# Patient Record
Sex: MALE | Race: WHITE | NOT HISPANIC OR LATINO | Employment: UNEMPLOYED | URBAN - METROPOLITAN AREA
[De-identification: names, ages, dates, MRNs, and addresses within clinical notes are randomized per-mention and may not be internally consistent; named-entity substitution may affect disease eponyms.]

---

## 2020-02-10 ENCOUNTER — HOSPITAL ENCOUNTER (INPATIENT)
Facility: HOSPITAL | Age: 65
LOS: 2 days | Discharge: HOME | DRG: 178 | End: 2020-02-12
Attending: EMERGENCY MEDICINE | Admitting: FAMILY MEDICINE
Payer: MEDICAID

## 2020-02-10 DIAGNOSIS — R91.8 PULMONARY NODULES: ICD-10-CM

## 2020-02-10 DIAGNOSIS — R06.02 SOB (SHORTNESS OF BREATH): ICD-10-CM

## 2020-02-10 DIAGNOSIS — R93.89 ABNORMAL CHEST X-RAY: Primary | ICD-10-CM

## 2020-02-10 DIAGNOSIS — J18.9 PNEUMONIA: ICD-10-CM

## 2020-02-10 DIAGNOSIS — R07.9 CHEST PAIN: ICD-10-CM

## 2020-02-10 PROBLEM — R53.83 FATIGUE: Status: ACTIVE | Noted: 2020-02-10

## 2020-02-10 PROBLEM — R64 CACHEXIA: Status: ACTIVE | Noted: 2020-02-10

## 2020-02-10 PROBLEM — I10 HYPERTENSION: Status: ACTIVE | Noted: 2020-02-10

## 2020-02-10 LAB
ALBUMIN SERPL BCP-MCNC: 3.2 G/DL (ref 3.5–5.2)
ALLENS TEST: NORMAL
ALP SERPL-CCNC: 104 U/L (ref 55–135)
ALT SERPL W/O P-5'-P-CCNC: 54 U/L (ref 10–44)
ANION GAP SERPL CALC-SCNC: 10 MMOL/L (ref 8–16)
APTT BLDCRRT: 34.8 SEC (ref 21–32)
AST SERPL-CCNC: 34 U/L (ref 10–40)
BASOPHILS # BLD AUTO: 0.05 K/UL (ref 0–0.2)
BASOPHILS NFR BLD: 0.5 % (ref 0–1.9)
BILIRUB SERPL-MCNC: 0.3 MG/DL (ref 0.1–1)
BILIRUB UR QL STRIP: NEGATIVE
BNP SERPL-MCNC: 31 PG/ML (ref 0–99)
BUN SERPL-MCNC: 14 MG/DL (ref 8–23)
CALCIUM SERPL-MCNC: 9.7 MG/DL (ref 8.7–10.5)
CHLORIDE SERPL-SCNC: 103 MMOL/L (ref 95–110)
CLARITY UR: CLEAR
CO2 SERPL-SCNC: 26 MMOL/L (ref 23–29)
COLOR UR: ABNORMAL
CREAT SERPL-MCNC: 0.9 MG/DL (ref 0.5–1.4)
D DIMER PPP IA.FEU-MCNC: 0.96 MG/L FEU
DELSYS: NORMAL
DIFFERENTIAL METHOD: ABNORMAL
EOSINOPHIL # BLD AUTO: 0.2 K/UL (ref 0–0.5)
EOSINOPHIL NFR BLD: 1.7 % (ref 0–8)
ERYTHROCYTE [DISTWIDTH] IN BLOOD BY AUTOMATED COUNT: 12.1 % (ref 11.5–14.5)
EST. GFR  (AFRICAN AMERICAN): >60 ML/MIN/1.73 M^2
EST. GFR  (NON AFRICAN AMERICAN): >60 ML/MIN/1.73 M^2
ESTIMATED AVG GLUCOSE: 114 MG/DL (ref 68–131)
ESTIMATED AVG GLUCOSE: 114 MG/DL (ref 68–131)
GLUCOSE SERPL-MCNC: 112 MG/DL (ref 70–110)
GLUCOSE UR QL STRIP: NEGATIVE
HBA1C MFR BLD HPLC: 5.6 % (ref 4–5.6)
HBA1C MFR BLD HPLC: 5.6 % (ref 4–5.6)
HCO3 UR-SCNC: 25.3 MMOL/L (ref 24–28)
HCT VFR BLD AUTO: 42.5 % (ref 40–54)
HGB BLD-MCNC: 14.2 G/DL (ref 14–18)
HGB UR QL STRIP: NEGATIVE
IMM GRANULOCYTES # BLD AUTO: 0.05 K/UL (ref 0–0.04)
IMM GRANULOCYTES NFR BLD AUTO: 0.5 % (ref 0–0.5)
INR PPP: 1 (ref 0.8–1.2)
KETONES UR QL STRIP: NEGATIVE
LEUKOCYTE ESTERASE UR QL STRIP: NEGATIVE
LYMPHOCYTES # BLD AUTO: 2.2 K/UL (ref 1–4.8)
LYMPHOCYTES NFR BLD: 20.6 % (ref 18–48)
MAGNESIUM SERPL-MCNC: 2 MG/DL (ref 1.6–2.6)
MCH RBC QN AUTO: 29.9 PG (ref 27–31)
MCHC RBC AUTO-ENTMCNC: 33.4 G/DL (ref 32–36)
MCV RBC AUTO: 90 FL (ref 82–98)
MONOCYTES # BLD AUTO: 0.8 K/UL (ref 0.3–1)
MONOCYTES NFR BLD: 7.5 % (ref 4–15)
NEUTROPHILS # BLD AUTO: 7.4 K/UL (ref 1.8–7.7)
NEUTROPHILS NFR BLD: 69.2 % (ref 38–73)
NITRITE UR QL STRIP: NEGATIVE
NRBC BLD-RTO: 0 /100 WBC
PCO2 BLDA: 36.9 MMHG (ref 35–45)
PH SMN: 7.44 [PH] (ref 7.35–7.45)
PH UR STRIP: 6 [PH] (ref 5–8)
PHOSPHATE SERPL-MCNC: 2.7 MG/DL (ref 2.7–4.5)
PLATELET # BLD AUTO: 598 K/UL (ref 150–350)
PMV BLD AUTO: 10.1 FL (ref 9.2–12.9)
PO2 BLDA: 86 MMHG (ref 80–100)
POC BE: 1 MMOL/L
POC SATURATED O2: 97 % (ref 95–100)
POC TCO2: 26 MMOL/L (ref 23–27)
POCT GLUCOSE: 83 MG/DL (ref 70–110)
POTASSIUM SERPL-SCNC: 3.6 MMOL/L (ref 3.5–5.1)
PROCALCITONIN SERPL IA-MCNC: 0.05 NG/ML
PROT SERPL-MCNC: 8 G/DL (ref 6–8.4)
PROT UR QL STRIP: ABNORMAL
PROTHROMBIN TIME: 10.6 SEC (ref 9–12.5)
RBC # BLD AUTO: 4.75 M/UL (ref 4.6–6.2)
SAMPLE: NORMAL
SITE: NORMAL
SODIUM SERPL-SCNC: 139 MMOL/L (ref 136–145)
SP GR UR STRIP: >=1.03 (ref 1–1.03)
TROPONIN I SERPL DL<=0.01 NG/ML-MCNC: 0.01 NG/ML (ref 0–0.03)
TSH SERPL DL<=0.005 MIU/L-ACNC: 2.59 UIU/ML (ref 0.4–4)
URN SPEC COLLECT METH UR: ABNORMAL
UROBILINOGEN UR STRIP-ACNC: NEGATIVE EU/DL
WBC # BLD AUTO: 10.69 K/UL (ref 3.9–12.7)

## 2020-02-10 PROCEDURE — 96374 THER/PROPH/DIAG INJ IV PUSH: CPT

## 2020-02-10 PROCEDURE — 12000002 HC ACUTE/MED SURGE SEMI-PRIVATE ROOM

## 2020-02-10 PROCEDURE — 93005 ELECTROCARDIOGRAM TRACING: CPT

## 2020-02-10 PROCEDURE — 25500020 PHARM REV CODE 255: Performed by: EMERGENCY MEDICINE

## 2020-02-10 PROCEDURE — 85730 THROMBOPLASTIN TIME PARTIAL: CPT

## 2020-02-10 PROCEDURE — 84484 ASSAY OF TROPONIN QUANT: CPT

## 2020-02-10 PROCEDURE — 93010 ELECTROCARDIOGRAM REPORT: CPT | Mod: ,,, | Performed by: INTERNAL MEDICINE

## 2020-02-10 PROCEDURE — 83880 ASSAY OF NATRIURETIC PEPTIDE: CPT

## 2020-02-10 PROCEDURE — G0378 HOSPITAL OBSERVATION PER HR: HCPCS

## 2020-02-10 PROCEDURE — 85610 PROTHROMBIN TIME: CPT

## 2020-02-10 PROCEDURE — 83735 ASSAY OF MAGNESIUM: CPT

## 2020-02-10 PROCEDURE — 93010 EKG 12-LEAD: ICD-10-PCS | Mod: 77,,, | Performed by: INTERNAL MEDICINE

## 2020-02-10 PROCEDURE — 25000003 PHARM REV CODE 250: Performed by: STUDENT IN AN ORGANIZED HEALTH CARE EDUCATION/TRAINING PROGRAM

## 2020-02-10 PROCEDURE — 86703 HIV-1/HIV-2 1 RESULT ANTBDY: CPT

## 2020-02-10 PROCEDURE — 84145 PROCALCITONIN (PCT): CPT

## 2020-02-10 PROCEDURE — 85379 FIBRIN DEGRADATION QUANT: CPT

## 2020-02-10 PROCEDURE — 86803 HEPATITIS C AB TEST: CPT

## 2020-02-10 PROCEDURE — 63600175 PHARM REV CODE 636 W HCPCS: Performed by: STUDENT IN AN ORGANIZED HEALTH CARE EDUCATION/TRAINING PROGRAM

## 2020-02-10 PROCEDURE — 85025 COMPLETE CBC W/AUTO DIFF WBC: CPT

## 2020-02-10 PROCEDURE — 25000003 PHARM REV CODE 250: Performed by: NURSE PRACTITIONER

## 2020-02-10 PROCEDURE — 84443 ASSAY THYROID STIM HORMONE: CPT

## 2020-02-10 PROCEDURE — 80053 COMPREHEN METABOLIC PANEL: CPT

## 2020-02-10 PROCEDURE — 81003 URINALYSIS AUTO W/O SCOPE: CPT

## 2020-02-10 PROCEDURE — 36600 WITHDRAWAL OF ARTERIAL BLOOD: CPT

## 2020-02-10 PROCEDURE — 99900035 HC TECH TIME PER 15 MIN (STAT)

## 2020-02-10 PROCEDURE — 99285 EMERGENCY DEPT VISIT HI MDM: CPT | Mod: 25

## 2020-02-10 PROCEDURE — 84100 ASSAY OF PHOSPHORUS: CPT

## 2020-02-10 PROCEDURE — 83036 HEMOGLOBIN GLYCOSYLATED A1C: CPT

## 2020-02-10 PROCEDURE — 93010 ELECTROCARDIOGRAM REPORT: CPT | Mod: 77,,, | Performed by: INTERNAL MEDICINE

## 2020-02-10 PROCEDURE — 25000003 PHARM REV CODE 250: Performed by: EMERGENCY MEDICINE

## 2020-02-10 PROCEDURE — 87385 HISTOPLASMA CAPSUL AG IA: CPT

## 2020-02-10 PROCEDURE — 25000242 PHARM REV CODE 250 ALT 637 W/ HCPCS: Performed by: EMERGENCY MEDICINE

## 2020-02-10 PROCEDURE — 82803 BLOOD GASES ANY COMBINATION: CPT

## 2020-02-10 PROCEDURE — 96372 THER/PROPH/DIAG INJ SC/IM: CPT | Mod: 59

## 2020-02-10 PROCEDURE — 93010 EKG 12-LEAD: ICD-10-PCS | Mod: ,,, | Performed by: INTERNAL MEDICINE

## 2020-02-10 PROCEDURE — 94640 AIRWAY INHALATION TREATMENT: CPT

## 2020-02-10 RX ORDER — ALBUTEROL SULFATE 2.5 MG/.5ML
2.5 SOLUTION RESPIRATORY (INHALATION)
Status: COMPLETED | OUTPATIENT
Start: 2020-02-10 | End: 2020-02-10

## 2020-02-10 RX ORDER — ASPIRIN 325 MG
325 TABLET ORAL
Status: COMPLETED | OUTPATIENT
Start: 2020-02-10 | End: 2020-02-10

## 2020-02-10 RX ORDER — SODIUM CHLORIDE 0.9 % (FLUSH) 0.9 %
5 SYRINGE (ML) INJECTION
Status: DISCONTINUED | OUTPATIENT
Start: 2020-02-10 | End: 2020-02-12 | Stop reason: HOSPADM

## 2020-02-10 RX ORDER — METOPROLOL TARTRATE 1 MG/ML
5 INJECTION, SOLUTION INTRAVENOUS EVERY 5 MIN PRN
Status: DISCONTINUED | OUTPATIENT
Start: 2020-02-10 | End: 2020-02-12 | Stop reason: HOSPADM

## 2020-02-10 RX ORDER — ENOXAPARIN SODIUM 100 MG/ML
40 INJECTION SUBCUTANEOUS EVERY 24 HOURS
Status: DISCONTINUED | OUTPATIENT
Start: 2020-02-10 | End: 2020-02-12 | Stop reason: HOSPADM

## 2020-02-10 RX ORDER — METOPROLOL TARTRATE 25 MG/1
25 TABLET, FILM COATED ORAL
Status: COMPLETED | OUTPATIENT
Start: 2020-02-10 | End: 2020-02-10

## 2020-02-10 RX ORDER — HYDRALAZINE HYDROCHLORIDE 20 MG/ML
10 INJECTION INTRAMUSCULAR; INTRAVENOUS EVERY 6 HOURS PRN
Status: DISCONTINUED | OUTPATIENT
Start: 2020-02-10 | End: 2020-02-12 | Stop reason: HOSPADM

## 2020-02-10 RX ORDER — AMOXICILLIN 250 MG
1 CAPSULE ORAL 2 TIMES DAILY
Status: DISCONTINUED | OUTPATIENT
Start: 2020-02-10 | End: 2020-02-12 | Stop reason: HOSPADM

## 2020-02-10 RX ADMIN — METOPROLOL TARTRATE 5 MG: 5 INJECTION, SOLUTION INTRAVENOUS at 04:02

## 2020-02-10 RX ADMIN — ALBUTEROL SULFATE 2.5 MG: 2.5 SOLUTION RESPIRATORY (INHALATION) at 03:02

## 2020-02-10 RX ADMIN — ASPIRIN 325 MG ORAL TABLET 325 MG: 325 PILL ORAL at 03:02

## 2020-02-10 RX ADMIN — METOPROLOL TARTRATE 25 MG: 25 TABLET ORAL at 04:02

## 2020-02-10 RX ADMIN — IOHEXOL 100 ML: 350 INJECTION, SOLUTION INTRAVENOUS at 07:02

## 2020-02-10 RX ADMIN — STANDARDIZED SENNA CONCENTRATE AND DOCUSATE SODIUM 1 TABLET: 8.6; 5 TABLET ORAL at 10:02

## 2020-02-10 RX ADMIN — ALBUTEROL SULFATE 2.5 MG: 2.5 SOLUTION RESPIRATORY (INHALATION) at 04:02

## 2020-02-10 RX ADMIN — ENOXAPARIN SODIUM 40 MG: 100 INJECTION SUBCUTANEOUS at 10:02

## 2020-02-10 NOTE — ED PROVIDER NOTES
Encounter Date: 2/10/2020       History     Chief Complaint   Patient presents with    Shortness of Breath     Reports SOB over the last 4 weeks. Worse with exertion. /136 at triage, pt using accessory muscles and displaying some difficulty with speaking completes sentences.      The patient is a 64 year male who has no diagnosed chronic medical conditions who presents with several days of exertional shortness of breath. This was preceded three and half weeks heavy cough.  The cough was intermittently productive of white sputum.  The cough was so severe that he was having ribcage discomfort.  He took cough drops and over-the-counter cough syrup which provided some relief.  He denies fever and chills.  He denies nausea vomiting.  Denies abdominal pain. He denies headache, lightheadedness, and dizziness.  He does not smoke cigarettes but does occasionally smoke marijuana.  He reports a 15-20 pound weight loss over the past 3-4 weeks.  He has also had decreased appetite over this time.    The history is provided by the patient. No  was used.     Review of patient's allergies indicates:  No Known Allergies  No past medical history on file.  No past surgical history on file.  No family history on file.  Social History     Tobacco Use    Smoking status: Not on file   Substance Use Topics    Alcohol use: Not on file    Drug use: Not on file     Review of Systems   Constitutional: Positive for appetite change and unexpected weight change. Negative for chills, diaphoresis, fatigue and fever.   HENT: Negative for sore throat and trouble swallowing.    Eyes: Negative for visual disturbance.   Respiratory: Positive for cough and shortness of breath.    Cardiovascular: Negative for chest pain, palpitations and leg swelling.   Gastrointestinal: Negative for abdominal pain, blood in stool, diarrhea, nausea and vomiting.   Endocrine: Negative for polydipsia and polyuria.   Genitourinary: Negative for  dysuria, frequency and hematuria.   Musculoskeletal: Negative for arthralgias and myalgias.   Skin: Negative for rash.   Allergic/Immunologic: Negative for immunocompromised state.   Neurological: Negative for dizziness, syncope, weakness, light-headedness, numbness and headaches.   Hematological: Does not bruise/bleed easily.       Physical Exam     Initial Vitals [02/10/20 1450]   BP Pulse Resp Temp SpO2   (!) 224/136 108 (!) 30 97.9 °F (36.6 °C) 95 %      MAP       --         Physical Exam    Nursing note and vitals reviewed.  Constitutional: He appears well-developed and well-nourished. He is not diaphoretic. No distress.   HENT:   Head: Normocephalic and atraumatic.   Mouth/Throat: Oropharynx is clear and moist. No oropharyngeal exudate or posterior oropharyngeal erythema.   Eyes: Conjunctivae are normal. No scleral icterus.   Neck: No JVD present.   Cardiovascular: Regular rhythm and normal heart sounds. Tachycardia present.  Exam reveals no gallop and no friction rub.    No murmur heard.  Pulmonary/Chest: Effort normal. No stridor. No respiratory distress. He has no decreased breath sounds. He has no wheezes. He has no rhonchi. He has rales (Bilateral lower lung zone).   Abdominal: Soft. He exhibits no distension. There is no tenderness.   Musculoskeletal: Normal range of motion. He exhibits no edema or tenderness.   No calf swelling or tenderness bilaterally. Negative bilateral Domingo's sign.   Neurological: He is alert and oriented to person, place, and time. No cranial nerve deficit. GCS score is 15. GCS eye subscore is 4. GCS verbal subscore is 5. GCS motor subscore is 6.   Skin: Skin is warm and dry. No pallor.         ED Course   Procedures  Labs Reviewed   CBC W/ AUTO DIFFERENTIAL - Abnormal; Notable for the following components:       Result Value    Platelets 598 (*)     Immature Grans (Abs) 0.05 (*)     All other components within normal limits   COMPREHENSIVE METABOLIC PANEL - Abnormal; Notable for  the following components:    Glucose 112 (*)     Albumin 3.2 (*)     ALT 54 (*)     All other components within normal limits   URINALYSIS, REFLEX TO URINE CULTURE - Abnormal; Notable for the following components:    Color, UA Brown (*)     Specific Gravity, UA >=1.030 (*)     Protein, UA Trace (*)     All other components within normal limits    Narrative:     Preferred Collection Type->Urine, Clean Catch   APTT - Abnormal; Notable for the following components:    aPTT 34.8 (*)     All other components within normal limits   D DIMER, QUANTITATIVE - Abnormal; Notable for the following components:    D-Dimer 0.96 (*)     All other components within normal limits   TROPONIN I   B-TYPE NATRIURETIC PEPTIDE   PROTIME-INR   D DIMER, QUANTITATIVE   ISTAT PROCEDURE     EKG Readings: (Independently Interpreted)   02/10/2020 15:08  Normal sinus rhythm.  Ventricular rate 91 beats per minute.  Normal axis.  Normal QRS interval.  Prolonged QT interval.  No ST segment elevation or depression.  Normal T-wave morphology.       Imaging Results           CTA Chest Non-Coronary (PE Study) (Final result)  Result time 02/10/20 19:59:29    Final result by Angel Patel MD (02/10/20 19:59:29)                 Impression:      This report was flagged in Epic as abnormal.    1. No pulmonary thromboembolism.  2. Pulmonary emphysematous change noting peribronchial thickening centrally, could reflect bronchial airway infection or inflammation.  3. Multifocal tree-in-bud type nodules with additional regions of consolidative opacity with central lucency, may reflect early cavitation.  The largest focus is within the right lower lobe.  Findings are nonspecific and could reflect sequela of multifocal infection, correlation is recommended.  Given background emphysema, malignancy can not be excluded in this setting.  Correlation with patient history and clinical symptoms is recommended.  Short-term follow-up, no greater than 3 months, is  recommended to ensure resolution and assess for progression.  Comparison with any previous examinations would be helpful.  4. Several additional findings above.      Electronically signed by: Angel Patel MD  Date:    02/10/2020  Time:    19:59             Narrative:    EXAMINATION:  CTA CHEST NON CORONARY    CLINICAL HISTORY:  Chest pain, acute, PE suspected, intermed prob, positive D-dimer;    TECHNIQUE:  Low dose axial images, sagittal and coronal reformations were obtained from the thoracic inlet to the lung bases following the IV administration of 100 mL of Omnipaque 350.  Contrast timing was optimized to evaluate the pulmonary arteries.  MIP images were performed.    COMPARISON:  None    FINDINGS:  The structures at the base of the neck are grossly unremarkable.  There are a few scattered nonenlarged mediastinal lymph nodes.  The heart is not enlarged.  There is calcification in the distribution of the coronary arteries.  No pericardial effusion.  The thoracic aorta tapers normally.  There is atherosclerotic calcification of the aorta.  The visualized portions of the kidneys, spleen, pancreas, gallbladder and liver are grossly unremarkable.  There is a minimal hiatal hernia.    The airways are patent, noting there is mild central peribronchial thickening.  There is bilateral emphysematous change.  There is atelectasis along the lateral aspect of the fissure on the right.  There is anterior right middle lobe atelectasis.  There is right basilar dependent atelectasis.  There are a few tree-in-bud type nodules within the periphery of the right lower lobe measuring up to 3-4 mm.  There is a focus of consolidation along the periphery of the right lower lobe with surrounding tree-in-bud type nodularity.  There is an additional focus of consolidative opacity with possible central lucency measuring 1.2 cm.  A similar although smaller focus is noted more cranially within the right lower lobe measuring 6-7 mm.  There  is right apical atelectasis or scarring.  There are scattered left upper lobe pleural based pulmonary nodules measuring 3-4 mm.  There is consolidative opacity with central lucency along the fissure on the left within the left lower lobe, measuring 1.9 cm.  There is a calcified granuloma within the left lower lobe.  There are scattered foci of tree-in-bud nodularity along the periphery of the left lower lobe.  No pneumothorax.  No pleural effusion.    Bolus timing is adequate for evaluation of pulmonary thromboembolism.  No pulmonary arterial filling defect to the level of the segmental arteries bilaterally to suggest pulmonary thromboembolism.    There is osteopenia.  Degenerative changes are noted of the spine.  Multilevel thoracic spine vertebral bodies Schmorl's nodes noted.  No significant axillary lymphadenopathy.                                X-Ray Chest AP Portable (Final result)  Result time 02/10/20 15:47:16    Final result by Ramon Nation MD (02/10/20 15:47:16)                 Impression:      Ill-defined masslike opacity over the right lower lung zone.  While this could represent a an area of focal consolidation/pneumonia, an underlying neoplasm could have a similar appearance and further evaluation with chest CT is recommended.  There is a small ill-defined nodule in the left lower lung zone which can also be evaluated with chest CT.    This report was flagged in Epic as abnormal.      Electronically signed by: Ramon Nation MD  Date:    02/10/2020  Time:    15:47             Narrative:    EXAMINATION:  XR CHEST AP PORTABLE    CLINICAL HISTORY:  SOB;    TECHNIQUE:  Single frontal view of the chest was performed.    COMPARISON:  06/15/2012    FINDINGS:  Cardiomediastinal silhouette is within normal limits.  Left lung is relatively clear and well expanded.  High density nodule in the left mid lung zone likely a granuloma.  There is an ill-defined nodule just inferior to this granuloma.  There is an  ill-defined masslike opacity over the right lower lung zone measuring approximately 5 cm.  No evidence of pneumothorax or large pleural effusion.  Bones show degenerative changes.                                 Medical Decision Making:   Independently Interpreted Test(s):   I have ordered and independently interpreted EKG Reading(s) - see prior notes  Clinical Tests:   Lab Tests: Ordered and Reviewed  Radiological Study: Ordered and Reviewed  Medical Tests: Ordered and Reviewed    Medical decision making:  This patient underwent evaluation for several days of exertional shortness of breath. This was preceded several weeks of heavy cough.  He also reported significant weight loss over this period of time.  Differential diagnoses included acute coronary syndromes, new onset CHF, cardiac arrhythmia, pneumonia and other infectious processes.  Chest x-ray was abnormal.  Subsequent CT of the chest had findings concerning for infectious process or malignancy.  The patient was admitted to Hospital Medicine for further evaluation and management.                   ED Course as of Feb 10 2035   Mon Feb 10, 2020   2035 Discussed the patient's presentation and workup with the [facility] family medicine resident who will evaluate the patient for admission.    [LP]      ED Course User Index  [LP] Kike Olsen III, MD                Clinical Impression:       ICD-10-CM ICD-9-CM   1. Abnormal chest x-ray R93.89 793.2   2. SOB (shortness of breath) R06.02 786.05   3. Pneumonia J18.9 486         Disposition:   Disposition: Admitted  Condition: Fair                     Kike Olsen III, MD  02/12/20 0726

## 2020-02-10 NOTE — ED NOTES
Patient placed on continuous cardiac monitor, automatic blood pressure cuff and continuous pulse oximeter. Pt placed on 2L O2 via NC.

## 2020-02-10 NOTE — ED TRIAGE NOTES
Pt presents to ED with complaints of SOB x1 week. Pt states that he had the flu about 2 weeks ago and thought he had gotten past it and states he felt fine. He states about a week ago he noticed he was getting winded doing activities he does every day. Pt states that the SOB has gotten worse over the week. Pt states he went to urgent care, but was told to come here. Pt states he has also had a productive cough with white sputum. Pt denies CP, abdominal pain, N/V/D, weakness, numbness, tingling, fever. Pt denies pain.

## 2020-02-10 NOTE — ED NOTES
Pt in bed talking on cellphone, pt denies needing anything at this time. Call light within reach, will continue to monitor.

## 2020-02-10 NOTE — ED NOTES
Spoke to Dr. Olsen in regards to pts BP as BP does not meet parameters set for IV Metoprolol; Per Dr. Olsen-hold off on IV metoprolol, to order PO instead

## 2020-02-11 PROBLEM — J18.9 PNEUMONIA: Status: ACTIVE | Noted: 2020-02-11

## 2020-02-11 LAB
ALBUMIN SERPL BCP-MCNC: 3 G/DL (ref 3.5–5.2)
ALP SERPL-CCNC: 94 U/L (ref 55–135)
ALT SERPL W/O P-5'-P-CCNC: 50 U/L (ref 10–44)
ANION GAP SERPL CALC-SCNC: 9 MMOL/L (ref 8–16)
AST SERPL-CCNC: 33 U/L (ref 10–40)
BASOPHILS # BLD AUTO: 0.06 K/UL (ref 0–0.2)
BASOPHILS NFR BLD: 0.6 % (ref 0–1.9)
BILIRUB SERPL-MCNC: 0.5 MG/DL (ref 0.1–1)
BUN SERPL-MCNC: 14 MG/DL (ref 8–23)
CALCIUM SERPL-MCNC: 9.5 MG/DL (ref 8.7–10.5)
CHLORIDE SERPL-SCNC: 104 MMOL/L (ref 95–110)
CO2 SERPL-SCNC: 26 MMOL/L (ref 23–29)
CREAT SERPL-MCNC: 0.8 MG/DL (ref 0.5–1.4)
CRP SERPL-MCNC: 17.6 MG/L (ref 0–8.2)
DIFFERENTIAL METHOD: ABNORMAL
EOSINOPHIL # BLD AUTO: 0.2 K/UL (ref 0–0.5)
EOSINOPHIL NFR BLD: 1.5 % (ref 0–8)
ERYTHROCYTE [DISTWIDTH] IN BLOOD BY AUTOMATED COUNT: 12.2 % (ref 11.5–14.5)
ERYTHROCYTE [SEDIMENTATION RATE] IN BLOOD BY WESTERGREN METHOD: 85 MM/HR (ref 0–10)
EST. GFR  (AFRICAN AMERICAN): >60 ML/MIN/1.73 M^2
EST. GFR  (NON AFRICAN AMERICAN): >60 ML/MIN/1.73 M^2
GLUCOSE SERPL-MCNC: 97 MG/DL (ref 70–110)
HCT VFR BLD AUTO: 39.9 % (ref 40–54)
HCV AB SERPL QL IA: NEGATIVE
HGB BLD-MCNC: 13.5 G/DL (ref 14–18)
HIV 1+2 AB+HIV1 P24 AG SERPL QL IA: NEGATIVE
IMM GRANULOCYTES # BLD AUTO: 0.02 K/UL (ref 0–0.04)
IMM GRANULOCYTES NFR BLD AUTO: 0.2 % (ref 0–0.5)
INFLUENZA A, MOLECULAR: NEGATIVE
INFLUENZA B, MOLECULAR: NEGATIVE
LDH SERPL L TO P-CCNC: 174 U/L (ref 110–260)
LYMPHOCYTES # BLD AUTO: 2.1 K/UL (ref 1–4.8)
LYMPHOCYTES NFR BLD: 20.2 % (ref 18–48)
MAGNESIUM SERPL-MCNC: 2.1 MG/DL (ref 1.6–2.6)
MCH RBC QN AUTO: 30.5 PG (ref 27–31)
MCHC RBC AUTO-ENTMCNC: 33.8 G/DL (ref 32–36)
MCV RBC AUTO: 90 FL (ref 82–98)
MONOCYTES # BLD AUTO: 0.7 K/UL (ref 0.3–1)
MONOCYTES NFR BLD: 7.2 % (ref 4–15)
NEUTROPHILS # BLD AUTO: 7.1 K/UL (ref 1.8–7.7)
NEUTROPHILS NFR BLD: 70.3 % (ref 38–73)
NRBC BLD-RTO: 0 /100 WBC
PHOSPHATE SERPL-MCNC: 2.9 MG/DL (ref 2.7–4.5)
PLATELET # BLD AUTO: 532 K/UL (ref 150–350)
PMV BLD AUTO: 9.4 FL (ref 9.2–12.9)
POCT GLUCOSE: 85 MG/DL (ref 70–110)
POCT GLUCOSE: 87 MG/DL (ref 70–110)
POTASSIUM SERPL-SCNC: 4.1 MMOL/L (ref 3.5–5.1)
PROT SERPL-MCNC: 7.4 G/DL (ref 6–8.4)
RBC # BLD AUTO: 4.43 M/UL (ref 4.6–6.2)
SODIUM SERPL-SCNC: 139 MMOL/L (ref 136–145)
SPECIMEN SOURCE: NORMAL
WBC # BLD AUTO: 10.15 K/UL (ref 3.9–12.7)

## 2020-02-11 PROCEDURE — 97165 OT EVAL LOW COMPLEX 30 MIN: CPT

## 2020-02-11 PROCEDURE — 85025 COMPLETE CBC W/AUTO DIFF WBC: CPT

## 2020-02-11 PROCEDURE — 97161 PT EVAL LOW COMPLEX 20 MIN: CPT

## 2020-02-11 PROCEDURE — 25000242 PHARM REV CODE 250 ALT 637 W/ HCPCS: Performed by: STUDENT IN AN ORGANIZED HEALTH CARE EDUCATION/TRAINING PROGRAM

## 2020-02-11 PROCEDURE — 63600175 PHARM REV CODE 636 W HCPCS: Performed by: STUDENT IN AN ORGANIZED HEALTH CARE EDUCATION/TRAINING PROGRAM

## 2020-02-11 PROCEDURE — 94640 AIRWAY INHALATION TREATMENT: CPT

## 2020-02-11 PROCEDURE — 94761 N-INVAS EAR/PLS OXIMETRY MLT: CPT

## 2020-02-11 PROCEDURE — 11000001 HC ACUTE MED/SURG PRIVATE ROOM

## 2020-02-11 PROCEDURE — 80053 COMPREHEN METABOLIC PANEL: CPT

## 2020-02-11 PROCEDURE — 85652 RBC SED RATE AUTOMATED: CPT

## 2020-02-11 PROCEDURE — 83615 LACTATE (LD) (LDH) ENZYME: CPT

## 2020-02-11 PROCEDURE — 25000003 PHARM REV CODE 250: Performed by: STUDENT IN AN ORGANIZED HEALTH CARE EDUCATION/TRAINING PROGRAM

## 2020-02-11 PROCEDURE — 86480 TB TEST CELL IMMUN MEASURE: CPT

## 2020-02-11 PROCEDURE — 86140 C-REACTIVE PROTEIN: CPT

## 2020-02-11 PROCEDURE — 83735 ASSAY OF MAGNESIUM: CPT

## 2020-02-11 PROCEDURE — 84100 ASSAY OF PHOSPHORUS: CPT

## 2020-02-11 PROCEDURE — 97535 SELF CARE MNGMENT TRAINING: CPT

## 2020-02-11 PROCEDURE — 87502 INFLUENZA DNA AMP PROBE: CPT

## 2020-02-11 PROCEDURE — 96375 TX/PRO/DX INJ NEW DRUG ADDON: CPT

## 2020-02-11 PROCEDURE — 99900035 HC TECH TIME PER 15 MIN (STAT)

## 2020-02-11 PROCEDURE — 27000221 HC OXYGEN, UP TO 24 HOURS

## 2020-02-11 RX ORDER — TALC
6 POWDER (GRAM) TOPICAL NIGHTLY PRN
Status: DISCONTINUED | OUTPATIENT
Start: 2020-02-11 | End: 2020-02-12 | Stop reason: HOSPADM

## 2020-02-11 RX ORDER — CEFTRIAXONE 1 G/1
1 INJECTION, POWDER, FOR SOLUTION INTRAMUSCULAR; INTRAVENOUS ONCE
Status: DISCONTINUED | OUTPATIENT
Start: 2020-02-11 | End: 2020-02-11

## 2020-02-11 RX ORDER — IPRATROPIUM BROMIDE AND ALBUTEROL SULFATE 2.5; .5 MG/3ML; MG/3ML
3 SOLUTION RESPIRATORY (INHALATION)
Status: DISCONTINUED | OUTPATIENT
Start: 2020-02-11 | End: 2020-02-12 | Stop reason: HOSPADM

## 2020-02-11 RX ORDER — AMLODIPINE BESYLATE 5 MG/1
10 TABLET ORAL DAILY
Status: DISCONTINUED | OUTPATIENT
Start: 2020-02-11 | End: 2020-02-12 | Stop reason: HOSPADM

## 2020-02-11 RX ORDER — IBUPROFEN 400 MG/1
400 TABLET ORAL EVERY 6 HOURS PRN
Status: DISCONTINUED | OUTPATIENT
Start: 2020-02-11 | End: 2020-02-12 | Stop reason: HOSPADM

## 2020-02-11 RX ADMIN — IPRATROPIUM BROMIDE AND ALBUTEROL SULFATE 3 ML: .5; 3 SOLUTION RESPIRATORY (INHALATION) at 11:02

## 2020-02-11 RX ADMIN — ENOXAPARIN SODIUM 40 MG: 100 INJECTION SUBCUTANEOUS at 10:02

## 2020-02-11 RX ADMIN — STANDARDIZED SENNA CONCENTRATE AND DOCUSATE SODIUM 1 TABLET: 8.6; 5 TABLET ORAL at 10:02

## 2020-02-11 RX ADMIN — HYDRALAZINE HYDROCHLORIDE 10 MG: 20 INJECTION INTRAMUSCULAR; INTRAVENOUS at 09:02

## 2020-02-11 RX ADMIN — STANDARDIZED SENNA CONCENTRATE AND DOCUSATE SODIUM 1 TABLET: 8.6; 5 TABLET ORAL at 09:02

## 2020-02-11 RX ADMIN — IPRATROPIUM BROMIDE AND ALBUTEROL SULFATE 3 ML: .5; 3 SOLUTION RESPIRATORY (INHALATION) at 04:02

## 2020-02-11 RX ADMIN — Medication 6 MG: at 10:02

## 2020-02-11 RX ADMIN — IPRATROPIUM BROMIDE AND ALBUTEROL SULFATE 3 ML: .5; 3 SOLUTION RESPIRATORY (INHALATION) at 08:02

## 2020-02-11 RX ADMIN — AZITHROMYCIN MONOHYDRATE 500 MG: 500 INJECTION, POWDER, LYOPHILIZED, FOR SOLUTION INTRAVENOUS at 11:02

## 2020-02-11 RX ADMIN — IBUPROFEN 400 MG: 400 TABLET, FILM COATED ORAL at 10:02

## 2020-02-11 RX ADMIN — AMLODIPINE BESYLATE 10 MG: 5 TABLET ORAL at 10:02

## 2020-02-11 RX ADMIN — CEFTRIAXONE 1 G: 1 INJECTION, SOLUTION INTRAVENOUS at 10:02

## 2020-02-11 NOTE — H&P
[facility] Medicine  History & Physical    Patient Name: Chuy Stevens  MRN: [id number]  Admission Date: 2/10/2020  Attending Physician: Andrés Gee MD   Primary Care Provider: Primary Doctor No         Patient information was obtained from patient and ER records.     Subjective:     Principal Problem:Pulmonary nodules    Chief Complaint:   Chief Complaint   Patient presents with    Shortness of Breath     Reports SOB over the last 4 weeks. Worse with exertion. /136 at triage, pt using accessory muscles and displaying some difficulty with speaking completes sentences.         HPI: Mr. Chuy Moran is a 63 yo man with no known PMH presenting to hospital for dyspnea on exertion over past 4 weeks. Patient BP was 244/136 at presentation, decreaed to 174/110 with metoprolol. Patient displayed some SOB. Patient DDimer was elevated, CTA negative for PE, but positive for nodules of unknown significance. Patient placed in isolation as TB precaution. Per patient, he smoked cigarettes from age 10 to age 27, and has since only smoked marijuana. Patient has used cocaine in the past, but denies IVDU. Patient's father  of liver cancer, and his brother was recently diagnosed. Patient is a  by [place], and has been active and exercising until for weeks ago when he had flu-like symptoms, cough productive of whitish sputum, and lost 25 lbs rapidly. His appetite has been decreased. Patient denies N/V, endorses chills and subjective fever. Patient admitted to hospital for further evaluation.     No past medical history on file.    No past surgical history on file.    Review of patient's allergies indicates:  No Known Allergies    No current facility-administered medications on file prior to encounter.      No current outpatient medications on file prior to encounter.     Family History     None        Tobacco Use    Smoking status: Not on file   Substance and Sexual Activity     Alcohol use: Not on file    Drug use: Not on file    Sexual activity: Not on file     Review of Systems   Constitutional: Positive for activity change, appetite change, chills, fatigue and unexpected weight change.   HENT: Positive for dental problem and ear pain. Negative for congestion, drooling, ear discharge, facial swelling, hearing loss, mouth sores, nosebleeds, postnasal drip, rhinorrhea, sinus pressure and sinus pain.    Eyes: Negative for discharge and itching.   Respiratory: Positive for cough, choking and shortness of breath. Negative for apnea, chest tightness, wheezing and stridor.    Cardiovascular: Negative for chest pain, palpitations and leg swelling.   Gastrointestinal: Negative for abdominal distention, abdominal pain, anal bleeding, blood in stool, constipation, diarrhea, nausea, rectal pain and vomiting.   Endocrine: Negative for cold intolerance, heat intolerance and polydipsia.   Musculoskeletal: Positive for myalgias. Negative for arthralgias, back pain, gait problem and joint swelling.   Skin: Positive for pallor. Negative for color change, rash and wound.   Neurological: Positive for dizziness, tremors, light-headedness and headaches. Negative for seizures, syncope and speech difficulty.        Family hx of parkinsons' disease, patient suspects his tremor may be parkinsons   Hematological: Negative for adenopathy.   Psychiatric/Behavioral: Negative for agitation, behavioral problems, confusion, decreased concentration and dysphoric mood.     Objective:     Vital Signs (Most Recent):  Temp: 97.9 °F (36.6 °C) (02/10/20 1450)  Pulse: 64 (02/10/20 1921)  Resp: 20 (02/10/20 1921)  BP: (!) 174/110 (02/10/20 1921)  SpO2: 99 % (02/10/20 1921) Vital Signs (24h Range):  Temp:  [97.9 °F (36.6 °C)] 97.9 °F (36.6 °C)  Pulse:  [] 64  Resp:  [18-31] 20  SpO2:  [95 %-100 %] 99 %  BP: (162-224)/() 174/110     Weight: 57.6 kg (127 lb)  Body mass index is 17.22 kg/m².    Physical Exam    Constitutional: He is oriented to person, place, and time. He appears cachectic. He is active. He appears ill. No distress.   HENT:   Head: Normocephalic and atraumatic.   Right Ear: External ear normal.   Left Ear: External ear normal.   Nose: Nose normal.   Mouth/Throat: Uvula is midline, oropharynx is clear and moist and mucous membranes are normal. Abnormal dentition. Dental caries present.   Eyes: Pupils are equal, round, and reactive to light. Conjunctivae and lids are normal. Right eye exhibits no discharge. Left eye exhibits no discharge and no exudate. No scleral icterus.   Neck: Trachea normal, normal range of motion and full passive range of motion without pain. Neck supple. No tracheal tenderness, no spinous process tenderness and no muscular tenderness present. No neck rigidity. No edema, no erythema and normal range of motion present. No thyroid mass and no thyromegaly present.   Cardiovascular: Normal rate, regular rhythm, normal heart sounds, intact distal pulses and normal pulses.   Pulmonary/Chest: Effort normal. No stridor. No apnea, no tachypnea and no bradypnea. No respiratory distress. He has decreased breath sounds. He has no wheezes. He has no rhonchi. He has no rales.   Abdominal: Soft. Normal appearance. He exhibits no distension and no ascites. There is no tenderness.   Musculoskeletal: Normal range of motion. He exhibits no edema, tenderness or deformity.   Neurological: He is alert and oriented to person, place, and time. No cranial nerve deficit. Coordination normal.   Resting tremor observed in bilateral UEs   Skin: Skin is warm and dry. Capillary refill takes less than 2 seconds. He is not diaphoretic. There is pallor.   Psychiatric: He has a normal mood and affect. His behavior is normal. Judgment and thought content normal.   Nursing note and vitals reviewed.        CRANIAL NERVES     CN III, IV, VI   Pupils are equal, round, and reactive to light.       Significant Labs:   Recent  Lab Results       02/10/20  1553   02/10/20  1515   02/10/20  1509        Albumin   3.2       Alkaline Phosphatase   104       Allens Test     Pass     ALT   54       Anion Gap   10       Appearance, UA Clear         aPTT   34.8  Comment:  aPTT therapeutic range = 39-69 seconds       AST   34       Baso #   0.05       Basophil%   0.5       Bilirubin (UA) Negative         BILIRUBIN TOTAL   0.3  Comment:  For infants and newborns, interpretation of results should be based  on gestational age, weight and in agreement with clinical  observations.  Premature Infant recommended reference ranges:  Up to 24 hours.............<8.0 mg/dL  Up to 48 hours............<12.0 mg/dL  3-5 days..................<15.0 mg/dL  6-29 days.................<15.0 mg/dL         BNP   31  Comment:  Values of less than 100 pg/ml are consistent with non-CHF populations.       Site     LR     BUN, Bld   14       Calcium   9.7       Chloride   103       CO2   26       Color, UA Brown         Creatinine   0.9       D-Dimer   0.96  Comment:  The quantitative D-dimer assay should be used as an aid in   the diagnosis of deep vein thrombosis and pulmonary embolism  in patients with the appropriate presentation and clinical  history. The upper limit of the reference interval and the clinical   cut off   point are identical. Causes of a positive (>0.50 mg/L FEU) D-Dimer   test  include, but are not limited to: DVT, PE, DIC, thrombolytic   therapy, anticoagulant therapy, recent surgery, trauma, or   pregnancy, disseminated malignancy, aortic aneurysm, cirrhosis,  and severe infection. False negative results may occur in   patients with distal DVT.         DelSys     Nasal Can     Differential Method   Automated       eGFR if    >60       eGFR if non    >60  Comment:  Calculation used to obtain the estimated glomerular filtration  rate (eGFR) is the CKD-EPI equation.          Eos #   0.2       Eosinophil%   1.7       Glucose    112       Glucose, UA Negative         Gran # (ANC)   7.4       Gran%   69.2       Hematocrit   42.5       Hemoglobin   14.2       Immature Grans (Abs)   0.05  Comment:  Mild elevation in immature granulocytes is non specific and   can be seen in a variety of conditions including stress response,   acute inflammation, trauma and pregnancy. Correlation with other   laboratory and clinical findings is essential.         Immature Granulocytes   0.5       INR   1.0  Comment:  Coumadin Therapy:  2.0 - 3.0 for INR for all indicators except mechanical heart valves  and antiphospholipid syndromes which should use 2.5 - 3.5.         Ketones, UA Negative         Leukocytes, UA Negative         Lymph #   2.2       Lymph%   20.6       MCH   29.9       MCHC   33.4       MCV   90       Mono #   0.8       Mono%   7.5       MPV   10.1       NITRITE UA Negative         nRBC   0       Occult Blood UA Negative         pH, UA 6.0         Platelets   598       POC BE     1     POC HCO3     25.3     POC PCO2     36.9     POC PH     7.443     POC PO2     86     POC SATURATED O2     97     POC TCO2     26     Potassium   3.6       PROTEIN TOTAL   8.0       Protein, UA Trace  Comment:  Recommend a 24 hour urine protein or a urine   protein/creatinine ratio if globulin induced proteinuria is  clinically suspected.           Protime   10.6       RBC   4.75       RDW   12.1       Sample     ARTERIAL     Sodium   139       Specific Gravity, UA >=1.030         Specimen UA Urine, Clean Catch         Troponin I   0.008  Comment:  The reference interval for Troponin I represents the 99th percentile   cutoff   for our facility and is consistent with 3rd generation assay   performance.         UROBILINOGEN UA Negative         WBC   10.69             Significant Imaging:   Imaging Results           CTA Chest Non-Coronary (PE Study) (Final result)  Result time 02/10/20 19:59:29    Final result by Angel Patel MD (02/10/20 19:59:29)                  Impression:      This report was flagged in Epic as abnormal.    1. No pulmonary thromboembolism.  2. Pulmonary emphysematous change noting peribronchial thickening centrally, could reflect bronchial airway infection or inflammation.  3. Multifocal tree-in-bud type nodules with additional regions of consolidative opacity with central lucency, may reflect early cavitation.  The largest focus is within the right lower lobe.  Findings are nonspecific and could reflect sequela of multifocal infection, correlation is recommended.  Given background emphysema, malignancy can not be excluded in this setting.  Correlation with patient history and clinical symptoms is recommended.  Short-term follow-up, no greater than 3 months, is recommended to ensure resolution and assess for progression.  Comparison with any previous examinations would be helpful.  4. Several additional findings above.      Electronically signed by: Angel Patel MD  Date:    02/10/2020  Time:    19:59             Narrative:    EXAMINATION:  CTA CHEST NON CORONARY    CLINICAL HISTORY:  Chest pain, acute, PE suspected, intermed prob, positive D-dimer;    TECHNIQUE:  Low dose axial images, sagittal and coronal reformations were obtained from the thoracic inlet to the lung bases following the IV administration of 100 mL of Omnipaque 350.  Contrast timing was optimized to evaluate the pulmonary arteries.  MIP images were performed.    COMPARISON:  None    FINDINGS:  The structures at the base of the neck are grossly unremarkable.  There are a few scattered nonenlarged mediastinal lymph nodes.  The heart is not enlarged.  There is calcification in the distribution of the coronary arteries.  No pericardial effusion.  The thoracic aorta tapers normally.  There is atherosclerotic calcification of the aorta.  The visualized portions of the kidneys, spleen, pancreas, gallbladder and liver are grossly unremarkable.  There is a minimal hiatal hernia.    The  airways are patent, noting there is mild central peribronchial thickening.  There is bilateral emphysematous change.  There is atelectasis along the lateral aspect of the fissure on the right.  There is anterior right middle lobe atelectasis.  There is right basilar dependent atelectasis.  There are a few tree-in-bud type nodules within the periphery of the right lower lobe measuring up to 3-4 mm.  There is a focus of consolidation along the periphery of the right lower lobe with surrounding tree-in-bud type nodularity.  There is an additional focus of consolidative opacity with possible central lucency measuring 1.2 cm.  A similar although smaller focus is noted more cranially within the right lower lobe measuring 6-7 mm.  There is right apical atelectasis or scarring.  There are scattered left upper lobe pleural based pulmonary nodules measuring 3-4 mm.  There is consolidative opacity with central lucency along the fissure on the left within the left lower lobe, measuring 1.9 cm.  There is a calcified granuloma within the left lower lobe.  There are scattered foci of tree-in-bud nodularity along the periphery of the left lower lobe.  No pneumothorax.  No pleural effusion.    Bolus timing is adequate for evaluation of pulmonary thromboembolism.  No pulmonary arterial filling defect to the level of the segmental arteries bilaterally to suggest pulmonary thromboembolism.    There is osteopenia.  Degenerative changes are noted of the spine.  Multilevel thoracic spine vertebral bodies Schmorl's nodes noted.  No significant axillary lymphadenopathy.                                X-Ray Chest AP Portable (Final result)  Result time 02/10/20 15:47:16    Final result by Ramon Nation MD (02/10/20 15:47:16)                 Impression:      Ill-defined masslike opacity over the right lower lung zone.  While this could represent a an area of focal consolidation/pneumonia, an underlying neoplasm could have a similar  appearance and further evaluation with chest CT is recommended.  There is a small ill-defined nodule in the left lower lung zone which can also be evaluated with chest CT.    This report was flagged in Epic as abnormal.      Electronically signed by: Ramon Nation MD  Date:    02/10/2020  Time:    15:47             Narrative:    EXAMINATION:  XR CHEST AP PORTABLE    CLINICAL HISTORY:  SOB;    TECHNIQUE:  Single frontal view of the chest was performed.    COMPARISON:  06/15/2012    FINDINGS:  Cardiomediastinal silhouette is within normal limits.  Left lung is relatively clear and well expanded.  High density nodule in the left mid lung zone likely a granuloma.  There is an ill-defined nodule just inferior to this granuloma.  There is an ill-defined masslike opacity over the right lower lung zone measuring approximately 5 cm.  No evidence of pneumothorax or large pleural effusion.  Bones show degenerative changes.                                  Assessment/Plan:     * Pulmonary nodules  -Concerning for malignancy versus infectious process  - Testing for Hep C, HIV, TB in process  - Pulmonary consulted, recs appreciated  - Patient placed on isolation precautions until infectious etiology ruled out  - Patient has thrombocytosis 598, concerning for malignancy    Hypertension  -Patient hypertensive in ED to , decreased to 174 with metoprolol  - PRN hydralazine for SBP>180      Cachexia  -Resume normal diet  -Nutrition consulted for optimization of diet  -Concerning in setting of lung mass for malignancy vs infectious process  -Pulm consulted        VTE Risk Mitigation (From admission, onward)         Ordered     enoxaparin injection 40 mg  Daily      02/10/20 2131     IP VTE HIGH RISK PATIENT  Once      02/10/20 2131     Place sequential compression device  Until discontinued      02/10/20 2131                   Adele Lisa MD  Department of Hospital Medicine   [facility]

## 2020-02-11 NOTE — ASSESSMENT & PLAN NOTE
- Concerning for malignancy versus infectious process  - Testing for Hep C, HIV, TB in process  - Pulmonary consulted, recs appreciated  - Patient placed on isolation precautions until infectious etiology ruled out  - Patient has thrombocytosis 598, concerning for malignancy  - Will consider consulting ID in the future  - Patient currently comfortable on O2 without desaturation or reported SOB

## 2020-02-11 NOTE — SUBJECTIVE & OBJECTIVE
Interval History: Patient seen and examined this morning. No acute events overnight. Patient continues to report some shortness of breath but it is adequately controlled on O2. No other complaints at this time.     Review of Systems   Constitutional: Positive for chills, fatigue and unexpected weight change. Negative for appetite change and fever.   HENT: Positive for dental problem. Negative for rhinorrhea, sinus pressure, sinus pain, sneezing and sore throat.    Respiratory: Positive for cough, chest tightness and shortness of breath. Negative for wheezing.    Cardiovascular: Negative for chest pain and palpitations.   Gastrointestinal: Negative for abdominal distention, abdominal pain, blood in stool, constipation, diarrhea, nausea and vomiting.   Genitourinary: Negative for dysuria, flank pain, frequency, hematuria and urgency.   Musculoskeletal: Negative for arthralgias and back pain.   Skin: Negative for color change and rash.   Neurological: Positive for dizziness, weakness and light-headedness. Negative for numbness and headaches.   Psychiatric/Behavioral: Negative for behavioral problems, confusion, decreased concentration and sleep disturbance. The patient is not nervous/anxious.      Objective:     Vital Signs (Most Recent):  Temp: 97.9 °F (36.6 °C) (02/10/20 1450)  Pulse: (!) 112 (02/11/20 1232)  Resp: (!) 28 (02/11/20 1232)  BP: (!) 152/97 (02/11/20 1232)  SpO2: (!) 93 % (02/11/20 1232) Vital Signs (24h Range):  Temp:  [97.9 °F (36.6 °C)] 97.9 °F (36.6 °C)  Pulse:  [] 112  Resp:  [18-40] 28  SpO2:  [91 %-100 %] 93 %  BP: (143-224)/() 152/97     Weight: 57.6 kg (127 lb)  Body mass index is 17.22 kg/m².    Intake/Output Summary (Last 24 hours) at 2/11/2020 1302  Last data filed at 2/11/2020 1252  Gross per 24 hour   Intake 350 ml   Output 950 ml   Net -600 ml      Physical Exam   Constitutional: He is oriented to person, place, and time. No distress.   Cachectic   HENT:   Head: Normocephalic.  "  Right Ear: External ear normal.   Left Ear: External ear normal.   Mouth/Throat: Abnormal dentition. Dental caries present.   Eyes: Pupils are equal, round, and reactive to light. Conjunctivae and EOM are normal.   Neck: Normal range of motion. Neck supple. No JVD present.   Cardiovascular: Normal rate, regular rhythm, normal heart sounds and intact distal pulses.   No murmur heard.  Pulmonary/Chest: Effort normal. No respiratory distress. He has no wheezes.   Poor air entry bilaterally   Abdominal: Soft. Bowel sounds are normal. He exhibits no distension and no mass. There is no tenderness.   Musculoskeletal: Normal range of motion. He exhibits no edema.   Neurological: He is alert and oriented to person, place, and time.   Skin: Skin is warm and dry. Capillary refill takes less than 2 seconds. He is not diaphoretic. There is pallor.   Psychiatric: He has a normal mood and affect. His behavior is normal.       Significant Labs:   CBC:   Recent Labs   Lab 02/10/20  1515 02/11/20  0802   WBC 10.69 10.15   HGB 14.2 13.5*   HCT 42.5 39.9*   * 532*     CMP:   Recent Labs   Lab 02/10/20  1515 02/11/20  0803    139   K 3.6 4.1    104   CO2 26 26   * 97   BUN 14 14   CREATININE 0.9 0.8   CALCIUM 9.7 9.5   PROT 8.0 7.4   ALBUMIN 3.2* 3.0*   BILITOT 0.3 0.5   ALKPHOS 104 94   AST 34 33   ALT 54* 50*   ANIONGAP 10 9   EGFRNONAA >60 >60       Significant Imaging:   CTA Chest: "The airways are patent, noting there is mild central peribronchial thickening.  There is bilateral emphysematous change.  There is atelectasis along the lateral aspect of the fissure on the right.  There is anterior right middle lobe atelectasis.  There is right basilar dependent atelectasis.  There are a few tree-in-bud type nodules within the periphery of the right lower lobe measuring up to 3-4 mm.  There is a focus of consolidation along the periphery of the right lower lobe with surrounding tree-in-bud type nodularity. " " There is an additional focus of consolidative opacity with possible central lucency measuring 1.2 cm.  A similar although smaller focus is noted more cranially within the right lower lobe measuring 6-7 mm.  There is right apical atelectasis or scarring.  There are scattered left upper lobe pleural based pulmonary nodules measuring 3-4 mm.  There is consolidative opacity with central lucency along the fissure on the left within the left lower lobe, measuring 1.9 cm.  There is a calcified granuloma within the left lower lobe.  There are scattered foci of tree-in-bud nodularity along the periphery of the left lower lobe.  No pneumothorax.  No pleural effusion."  "

## 2020-02-11 NOTE — ASSESSMENT & PLAN NOTE
-Concerning for malignancy versus infectious process  - Testing for Hep C, HIV, TB in process  - Pulmonary consulted, recs appreciated  - Patient placed on isolation precautions until infectious etiology ruled out  - Patient has thrombocytosis 598, concerning for malignancy

## 2020-02-11 NOTE — PLAN OF CARE
VN cued into room.  Pt resting comfortably in bed with call light and personal belongings within reach.  NADN.  No family at bedside.  Pt reports no pain.  Pt able to answer all admission questions.  Consulted SW for advanced care planning.  Consulted  for spiritual needs.  Educated pt on role of VN and how to use the phone.  No other needs or complaints at this time.  Reminded pt to use call light as needed.  VN will continue to follow and be available as needed.

## 2020-02-11 NOTE — SUBJECTIVE & OBJECTIVE
No past medical history on file.    No past surgical history on file.    Review of patient's allergies indicates:  No Known Allergies    No current facility-administered medications on file prior to encounter.      No current outpatient medications on file prior to encounter.     Family History     None        Tobacco Use    Smoking status: Not on file   Substance and Sexual Activity    Alcohol use: Not on file    Drug use: Not on file    Sexual activity: Not on file     Review of Systems   Constitutional: Positive for activity change, appetite change, chills, fatigue and unexpected weight change.   HENT: Positive for dental problem and ear pain. Negative for congestion, drooling, ear discharge, facial swelling, hearing loss, mouth sores, nosebleeds, postnasal drip, rhinorrhea, sinus pressure and sinus pain.    Eyes: Negative for discharge and itching.   Respiratory: Positive for cough, choking and shortness of breath. Negative for apnea, chest tightness, wheezing and stridor.    Cardiovascular: Negative for chest pain, palpitations and leg swelling.   Gastrointestinal: Negative for abdominal distention, abdominal pain, anal bleeding, blood in stool, constipation, diarrhea, nausea, rectal pain and vomiting.   Endocrine: Negative for cold intolerance, heat intolerance and polydipsia.   Musculoskeletal: Positive for myalgias. Negative for arthralgias, back pain, gait problem and joint swelling.   Skin: Positive for pallor. Negative for color change, rash and wound.   Neurological: Positive for dizziness, tremors, light-headedness and headaches. Negative for seizures, syncope and speech difficulty.        Family hx of parkinsons' disease, patient suspects his tremor may be parkinsons   Hematological: Negative for adenopathy.   Psychiatric/Behavioral: Negative for agitation, behavioral problems, confusion, decreased concentration and dysphoric mood.     Objective:     Vital Signs (Most Recent):  Temp: 97.9 °F (36.6  °C) (02/10/20 1450)  Pulse: 64 (02/10/20 1921)  Resp: 20 (02/10/20 1921)  BP: (!) 174/110 (02/10/20 1921)  SpO2: 99 % (02/10/20 1921) Vital Signs (24h Range):  Temp:  [97.9 °F (36.6 °C)] 97.9 °F (36.6 °C)  Pulse:  [] 64  Resp:  [18-31] 20  SpO2:  [95 %-100 %] 99 %  BP: (162-224)/() 174/110     Weight: 57.6 kg (127 lb)  Body mass index is 17.22 kg/m².    Physical Exam   Constitutional: He is oriented to person, place, and time. He appears cachectic. He is active. He appears ill. No distress.   HENT:   Head: Normocephalic and atraumatic.   Right Ear: External ear normal.   Left Ear: External ear normal.   Nose: Nose normal.   Mouth/Throat: Uvula is midline, oropharynx is clear and moist and mucous membranes are normal. Abnormal dentition. Dental caries present.   Eyes: Pupils are equal, round, and reactive to light. Conjunctivae and lids are normal. Right eye exhibits no discharge. Left eye exhibits no discharge and no exudate. No scleral icterus.   Neck: Trachea normal, normal range of motion and full passive range of motion without pain. Neck supple. No tracheal tenderness, no spinous process tenderness and no muscular tenderness present. No neck rigidity. No edema, no erythema and normal range of motion present. No thyroid mass and no thyromegaly present.   Cardiovascular: Normal rate, regular rhythm, normal heart sounds, intact distal pulses and normal pulses.   Pulmonary/Chest: Effort normal. No stridor. No apnea, no tachypnea and no bradypnea. No respiratory distress. He has decreased breath sounds. He has no wheezes. He has no rhonchi. He has no rales.   Abdominal: Soft. Normal appearance. He exhibits no distension and no ascites. There is no tenderness.   Musculoskeletal: Normal range of motion. He exhibits no edema, tenderness or deformity.   Neurological: He is alert and oriented to person, place, and time. No cranial nerve deficit. Coordination normal.   Resting tremor observed in bilateral UEs    Skin: Skin is warm and dry. Capillary refill takes less than 2 seconds. He is not diaphoretic. There is pallor.   Psychiatric: He has a normal mood and affect. His behavior is normal. Judgment and thought content normal.   Nursing note and vitals reviewed.        CRANIAL NERVES     CN III, IV, VI   Pupils are equal, round, and reactive to light.       Significant Labs:   Recent Lab Results       02/10/20  1553   02/10/20  1515   02/10/20  1509        Albumin   3.2       Alkaline Phosphatase   104       Allens Test     Pass     ALT   54       Anion Gap   10       Appearance, UA Clear         aPTT   34.8  Comment:  aPTT therapeutic range = 39-69 seconds       AST   34       Baso #   0.05       Basophil%   0.5       Bilirubin (UA) Negative         BILIRUBIN TOTAL   0.3  Comment:  For infants and newborns, interpretation of results should be based  on gestational age, weight and in agreement with clinical  observations.  Premature Infant recommended reference ranges:  Up to 24 hours.............<8.0 mg/dL  Up to 48 hours............<12.0 mg/dL  3-5 days..................<15.0 mg/dL  6-29 days.................<15.0 mg/dL         BNP   31  Comment:  Values of less than 100 pg/ml are consistent with non-CHF populations.       Site     LR     BUN, Bld   14       Calcium   9.7       Chloride   103       CO2   26       Color, UA Brown         Creatinine   0.9       D-Dimer   0.96  Comment:  The quantitative D-dimer assay should be used as an aid in   the diagnosis of deep vein thrombosis and pulmonary embolism  in patients with the appropriate presentation and clinical  history. The upper limit of the reference interval and the clinical   cut off   point are identical. Causes of a positive (>0.50 mg/L FEU) D-Dimer   test  include, but are not limited to: DVT, PE, DIC, thrombolytic   therapy, anticoagulant therapy, recent surgery, trauma, or   pregnancy, disseminated malignancy, aortic aneurysm, cirrhosis,  and severe  infection. False negative results may occur in   patients with distal DVT.         DelSys     Nasal Can     Differential Method   Automated       eGFR if    >60       eGFR if non    >60  Comment:  Calculation used to obtain the estimated glomerular filtration  rate (eGFR) is the CKD-EPI equation.          Eos #   0.2       Eosinophil%   1.7       Glucose   112       Glucose, UA Negative         Gran # (ANC)   7.4       Gran%   69.2       Hematocrit   42.5       Hemoglobin   14.2       Immature Grans (Abs)   0.05  Comment:  Mild elevation in immature granulocytes is non specific and   can be seen in a variety of conditions including stress response,   acute inflammation, trauma and pregnancy. Correlation with other   laboratory and clinical findings is essential.         Immature Granulocytes   0.5       INR   1.0  Comment:  Coumadin Therapy:  2.0 - 3.0 for INR for all indicators except mechanical heart valves  and antiphospholipid syndromes which should use 2.5 - 3.5.         Ketones, UA Negative         Leukocytes, UA Negative         Lymph #   2.2       Lymph%   20.6       MCH   29.9       MCHC   33.4       MCV   90       Mono #   0.8       Mono%   7.5       MPV   10.1       NITRITE UA Negative         nRBC   0       Occult Blood UA Negative         pH, UA 6.0         Platelets   598       POC BE     1     POC HCO3     25.3     POC PCO2     36.9     POC PH     7.443     POC PO2     86     POC SATURATED O2     97     POC TCO2     26     Potassium   3.6       PROTEIN TOTAL   8.0       Protein, UA Trace  Comment:  Recommend a 24 hour urine protein or a urine   protein/creatinine ratio if globulin induced proteinuria is  clinically suspected.           Protime   10.6       RBC   4.75       RDW   12.1       Sample     ARTERIAL     Sodium   139       Specific Gravity, UA >=1.030         Specimen UA Urine, Clean Catch         Troponin I   0.008  Comment:  The reference interval for  Troponin I represents the 99th percentile   cutoff   for our facility and is consistent with 3rd generation assay   performance.         UROBILINOGEN UA Negative         WBC   10.69             Significant Imaging:   Imaging Results           CTA Chest Non-Coronary (PE Study) (Final result)  Result time 02/10/20 19:59:29    Final result by Angel Patel MD (02/10/20 19:59:29)                 Impression:      This report was flagged in Epic as abnormal.    1. No pulmonary thromboembolism.  2. Pulmonary emphysematous change noting peribronchial thickening centrally, could reflect bronchial airway infection or inflammation.  3. Multifocal tree-in-bud type nodules with additional regions of consolidative opacity with central lucency, may reflect early cavitation.  The largest focus is within the right lower lobe.  Findings are nonspecific and could reflect sequela of multifocal infection, correlation is recommended.  Given background emphysema, malignancy can not be excluded in this setting.  Correlation with patient history and clinical symptoms is recommended.  Short-term follow-up, no greater than 3 months, is recommended to ensure resolution and assess for progression.  Comparison with any previous examinations would be helpful.  4. Several additional findings above.      Electronically signed by: Angel Patel MD  Date:    02/10/2020  Time:    19:59             Narrative:    EXAMINATION:  CTA CHEST NON CORONARY    CLINICAL HISTORY:  Chest pain, acute, PE suspected, intermed prob, positive D-dimer;    TECHNIQUE:  Low dose axial images, sagittal and coronal reformations were obtained from the thoracic inlet to the lung bases following the IV administration of 100 mL of Omnipaque 350.  Contrast timing was optimized to evaluate the pulmonary arteries.  MIP images were performed.    COMPARISON:  None    FINDINGS:  The structures at the base of the neck are grossly unremarkable.  There are a few scattered  nonenlarged mediastinal lymph nodes.  The heart is not enlarged.  There is calcification in the distribution of the coronary arteries.  No pericardial effusion.  The thoracic aorta tapers normally.  There is atherosclerotic calcification of the aorta.  The visualized portions of the kidneys, spleen, pancreas, gallbladder and liver are grossly unremarkable.  There is a minimal hiatal hernia.    The airways are patent, noting there is mild central peribronchial thickening.  There is bilateral emphysematous change.  There is atelectasis along the lateral aspect of the fissure on the right.  There is anterior right middle lobe atelectasis.  There is right basilar dependent atelectasis.  There are a few tree-in-bud type nodules within the periphery of the right lower lobe measuring up to 3-4 mm.  There is a focus of consolidation along the periphery of the right lower lobe with surrounding tree-in-bud type nodularity.  There is an additional focus of consolidative opacity with possible central lucency measuring 1.2 cm.  A similar although smaller focus is noted more cranially within the right lower lobe measuring 6-7 mm.  There is right apical atelectasis or scarring.  There are scattered left upper lobe pleural based pulmonary nodules measuring 3-4 mm.  There is consolidative opacity with central lucency along the fissure on the left within the left lower lobe, measuring 1.9 cm.  There is a calcified granuloma within the left lower lobe.  There are scattered foci of tree-in-bud nodularity along the periphery of the left lower lobe.  No pneumothorax.  No pleural effusion.    Bolus timing is adequate for evaluation of pulmonary thromboembolism.  No pulmonary arterial filling defect to the level of the segmental arteries bilaterally to suggest pulmonary thromboembolism.    There is osteopenia.  Degenerative changes are noted of the spine.  Multilevel thoracic spine vertebral bodies Schmorl's nodes noted.  No significant  axillary lymphadenopathy.                                X-Ray Chest AP Portable (Final result)  Result time 02/10/20 15:47:16    Final result by Ramon Nation MD (02/10/20 15:47:16)                 Impression:      Ill-defined masslike opacity over the right lower lung zone.  While this could represent a an area of focal consolidation/pneumonia, an underlying neoplasm could have a similar appearance and further evaluation with chest CT is recommended.  There is a small ill-defined nodule in the left lower lung zone which can also be evaluated with chest CT.    This report was flagged in Epic as abnormal.      Electronically signed by: Ramon Nation MD  Date:    02/10/2020  Time:    15:47             Narrative:    EXAMINATION:  XR CHEST AP PORTABLE    CLINICAL HISTORY:  SOB;    TECHNIQUE:  Single frontal view of the chest was performed.    COMPARISON:  06/15/2012    FINDINGS:  Cardiomediastinal silhouette is within normal limits.  Left lung is relatively clear and well expanded.  High density nodule in the left mid lung zone likely a granuloma.  There is an ill-defined nodule just inferior to this granuloma.  There is an ill-defined masslike opacity over the right lower lung zone measuring approximately 5 cm.  No evidence of pneumothorax or large pleural effusion.  Bones show degenerative changes.

## 2020-02-11 NOTE — PT/OT/SLP EVAL
"Occupational Therapy   Evaluation    Name: Chuy Stevens  MRN: [id number]  Admitting Diagnosis:  Pulmonary nodules      Recommendations:     Discharge Recommendations: outpatient OT  Discharge Equipment Recommendations:  shower chair  Barriers to discharge:  None    Assessment:     Chuy Stevens is a 64 y.o. male with a medical diagnosis of Pulmonary nodules.  He presents with SOB, GRAY. Pt with tremulous BUE during functional task practice. Pt reports he believes "it . Performance deficits affecting function:  .      Rehab Prognosis: Good; patient would benefit from acute skilled OT services to address these deficits and reach maximum level of function.       Plan:     Patient to be seen   to address the above listed problems via    · Plan of Care Expires: 02/11/20  · Plan of Care Reviewed with: patient    Subjective     Chief Complaint: Pt states that he is very fatigued after physical activity which is not typical for him  Patient/Family Comments/goals: To return to PLOF    Occupational Profile:  Living Environment: Pt lives alone SS, 2-3 TRIPP, WIS  Previous level of function: Indep functional mobility and ADLs  Roles and Routines: Caretaker to self and home. Cooks, cleans, drives, completes own grocery shopping.  Equipment Used at Home:  none  Assistance upon Discharge: Limited, no family stated for assistance    Pain/Comfort:  Pain Rating 1: 0/10    Patients cultural, spiritual, Congregation conflicts given the current situation:      Objective:     Communicated with: nsdoris prior to session.  Patient found HOB elevated with blood pressure cuff, pulse ox (continuous), telemetry, peripheral IV, oxygen upon OT entry to room.    General Precautions: Standard, fall, airborne, contact, respiratory   Orthopedic Precautions:N/A   Braces: N/A     Occupational Performance:    Bed Mobility:    · Patient completed Rolling/Turning to Right with supervision  · Patient completed Scooting/Bridging with supervision  · Patient " "completed Supine to Sit with supervision  · Patient completed Sit to Supine with supervision    Functional Mobility/Transfers:  · Patient completed Sit <> Stand Transfer with supervision  with  no assistive device   Functional Mobility: Pt with fair dynamic seated and standing balance.     Activities of Daily Living:  · Upper Body Dressing: supervision to don gown seated EOB  · Lower Body Dressing: stand by assistance and mod v/c for PLB to don/doff B shoes and te B shoes seated EOB, pt edu re figure of four technique and modified figure of four technique 2/2 spO2 desats (reading as low as 79% but poor wave form noted) to reduce trunk instability and improve oxygenation    Cognitive/Visual Perceptual:  Cognitive/Psychosocial Skills:     -       Oriented to: Person, Place, Time and Situation   -       Follows Commands/attention:Follows multistep  commands  -       Communication: clear/fluent  -       Memory: No Deficits noted  -       Safety awareness/insight to disability: intact   -       Mood/Affect/Coping skills/emotional control: Appropriate to situation     Physical Exam:  Postural examination/scapula alignment:    -       Rounded shoulders  Skin integrity: Visible skin intact  Sensation:    -       Intact  Motor Planning:    -       WFL  Dominant hand:    -       R handed  Upper Extremity Range of Motion: BUE WFL     Upper Extremity Strength:  BUE grossly 4-/5   Strength:  B hands WFL  Fine Motor Coordination:    -       Intact  Gross motor coordination:   BUE possible intention tremors as pt not tremulous at rest, increased tremors noted with prolonged task practice. Pt states, "I think it's something like Parkinson's"    AMPA 6 Click ADL:  AMPAC Total Score: 22    Treatment & Education:  Pt educated on role of OT and POC.   Pt performing skills as listed above.   Pt edu re PLB and required mod v/c to continue to do so throughout session. Pt edu re energy conservation and pacing techniques 2/2 " fluctuations in spO2 sats. Pt educated on adaptive techniques and devices to reduce tremor for GM/FM skills such as eating and brushing teeth as pt reports these tasks can be very difficult for him when tremulous.  Education:    Patient left HOB elevated with all lines intact, call button in reach and nsg notified    GOALS:   Multidisciplinary Problems     Occupational Therapy Goals        Problem: Occupational Therapy Goal    Goal Priority Disciplines Outcome Interventions   Occupational Therapy Goal     OT, PT/OT Ongoing, Progressing    Description:  Goals to be met by: 03/11/2020     Patient will increase functional independence with ADLs by performing:    Feeding with Modified [place].  Grooming while standing with Modified [place].  Toileting from toilet with Modified [place] for hygiene and clothing management.   Toilet transfer to toilet with Supervision.  Increased functional strength to WFL for self care.  Upper extremity exercise program x10 reps per handout, with independence.                      History:     History reviewed. No pertinent past medical history.    History reviewed. No pertinent surgical history.    Time Tracking:     OT Date of Treatment: 02/11/20  OT Start Time: 1222  OT Stop Time: 1245  OT Total Time (min): 23 min    Billable Minutes:Evaluation 13  Self Care/Home Management 10    Shanda Mancera, OT  2/11/2020

## 2020-02-11 NOTE — PT/OT/SLP EVAL
Physical Therapy Evaluation    Patient Name:  Chuy Stevens   MRN:  [id number]    Recommendations:     Discharge Recommendations:  home with home health   Discharge Equipment Recommendations: shower chair   Barriers to discharge: None    Assessment:     Chuy Stevens is a 64 y.o. male admitted with a medical diagnosis of Pulmonary nodules.  He presents with the following impairments/functional limitations:  weakness, impaired cardiopulmonary response to activity, impaired endurance, impaired balance . Patient CC SOB at rest and with activity. Patient able to come from supine <> sit <> stand Mod I. Gait limited able to side step along edge of stretcher with supervision and O2 nasal in place. Returned to supine Mod I increase SOB noted with limited activity.    Rehab Prognosis: Good; patient would benefit from acute skilled PT services to address these deficits and reach maximum level of function.    Recent Surgery: * No surgery found *      Plan:     During this hospitalization, patient to be seen 5 x/week to address the identified rehab impairments via gait training, therapeutic activities, therapeutic exercises and progress toward the following goals:    · Plan of Care Expires:  03/11/20    Subjective     Chief Complaint: SOB  Patient/Family Comments/goals: go home  Pain/Comfort:  · Pain Rating 1: 0/10  · Pain Rating Post-Intervention 1: 0/10    Patients cultural, spiritual, Confucianist conflicts given the current situation:      Living Environment:  Lives alone in [facility] ~ 3 steps to enter t/s combo  Prior to admission, patients level of function was independent.  Equipment used at home: none.  DME owned (not currently used): rolling walker and single point cane.  Upon discharge, patient will have assistance from unknown.    Objective:     Communicated with primary nurse prior to session.  Patient found HOB elevated with telemetry, pulse ox (continuous), oxygen, blood pressure cuff  upon PT entry to  room.    General Precautions: Standard, fall, droplet   Orthopedic Precautions:N/A   Braces: N/A     Exams:  · RLE ROM: WFL  · RLE Strength: WFL  · LLE ROM: WFL  · LLE Strength: WFL    Functional Mobility:  · Bed Mobility:     · Supine to Sit: independence  · Sit to Supine: independence  · Transfers:     · Sit to Stand:  modified independence with no AD  · Gait: Supervision to SBA for limited side steps no AD  · Balance: Fair +      Therapeutic Activities and Exercises:   na    AM-PAC 6 CLICK MOBILITY  Total Score:22     Patient left HOB elevated with all lines intact, call button in reach and primary nurse notified.    GOALS:   Multidisciplinary Problems     Physical Therapy Goals        Problem: Physical Therapy Goal    Goal Priority Disciplines Outcome Goal Variances Interventions   Physical Therapy Goal     PT, PT/OT Ongoing, Progressing     Description:  Goals to be met by: 3/11/2020     Patient will increase functional independence with mobility by performin. Supine to sit with Modified [place]  2. Sit to stand transfer with Modified [place]  3. Gait  x >200 feet with [place] using most appropriate AD or no AD.                       History:     History reviewed. No pertinent past medical history.    History reviewed. No pertinent surgical history.    Time Tracking:     PT Received On: 20  PT Start Time: 1257     PT Stop Time: 1316  PT Total Time (min): 19 min     Billable Minutes: Evaluation 19      Patel Sue, PT  2020

## 2020-02-11 NOTE — ASSESSMENT & PLAN NOTE
-Patient hypertensive in ED to , decreased to 174 with metoprolol  - PRN hydralazine for SBP>180

## 2020-02-11 NOTE — PLAN OF CARE
Problem: Occupational Therapy Goal  Goal: Occupational Therapy Goal  Description  Goals to be met by: 03/11/2020     Patient will increase functional independence with ADLs by performing:    Feeding with Modified [place].  Grooming while standing with Modified [place].  Toileting from toilet with Modified [place] for hygiene and clothing management.   Toilet transfer to toilet with Supervision.  Increased functional strength to WFL for self care.  Upper extremity exercise program x10 reps per handout, with independence.     Outcome: Ongoing, Progressing   Pt would benefit from continued OT to address deficits in self care and functional mobility. Recommending OP OT; DME needs SC

## 2020-02-11 NOTE — ASSESSMENT & PLAN NOTE
-Resume normal diet  -Nutrition consulted for optimization of diet  -Concerning in setting of lung mass for malignancy vs infectious process  -Pulm consulted

## 2020-02-11 NOTE — HPI
Mr. Chuy Moran is a 63 yo man with no known PMH presenting to hospital for dyspnea on exertion over past 4 weeks. Patient BP was 244/136 at presentation, decreaed to 174/110 with metoprolol. Patient displayed some SOB. Patient DDimer was elevated, CTA negative for PE, but positive for nodules of unknown significance. Patient placed in isolation as TB precaution. Per patient, he smoked cigarettes from age 10 to age 27, and has since only smoked marijuana. Patient has used cocaine in the past, but denies IVDU. Patient's father  of liver cancer, and his brother was recently diagnosed. Patient is a  by [employer], and has been active and exercising until for weeks ago when he had flu-like symptoms, cough productive of whitish sputum, and lost 25 lbs rapidly. His appetite has been decreased. Patient denies N/V, endorses chills and subjective fever. Patient admitted to hospital for further evaluation.

## 2020-02-11 NOTE — CONSULTS
"\[facility]\"" Internal Medicine Consult Note - Resident Note  Patient: Chuy Stevens   MRN: [id number]      Admitting Team: [facility] Hospitalist Team   Attending Physician: Dr. Andrés Gee MD  Consulting Attending: Eleanor  Consulting Fellow: Hien    Date of Admit: 2/10/2020      Reason for Consult     Newly diagnosed pulmonary nodule    Subjective:      History of Present Illness:  Chuy Stevens is a 64 y.o. male, who has not seen a physician in over 10 years and therefore has no medical history to present; he presented to the ED with 4 weeks of progressive SOB, weight loss and productive cough. Mr. Stevens states that 4 weeks ago he felt he had "the flu" with cough, fevers and chills. His cough was productive of white sputum. However his SOB did not improve and he noted a decrease in appetite and a weight loss of 25 Ib in the last 4 weeks. His SOB was worse with exertion. He tried use OTC medicines for his cough, with no relief.   He endorses brief headaches and nausea, but denies vision changes, vomiting, diarrhea, constipation, lightheadedness, PND or chest pain.   He reports that he does not currently smoke cigarettes. He has a remote history of about 10 years until his late 20s when he did smoke. Now he endorses only marijuana use. He is a private , and uses RoundUp on occasion. 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 He has been in group home overnight but never senior living and denies homelessness.       History obtained from: patient     Review of Systems:  Pertinent items are noted in HPI. 12 point ROS negative other than noted in HPI and below.      Past Medical History:  No past medical history on file.    Past Surgical History:  No past surgical history on file.    Allergies:  Review of patient's allergies indicates:  No Known Allergies    Home Medications:  Prior to Admission medications    Not on " File     none    Family History:  No family history on file.    Social History:  Social History     Tobacco Use    Smoking status: Not on file   Substance Use Topics    Alcohol use: Not on file    Drug use: Not on file       Health Maintenance:     Primary Care Physician: Primary Doctor No    Immunizations:   Currently on File with [facility]:   There is no immunization history on file for this patient.  Pt is not up to date with any vaccinations.     Health Maintenance Due   Topic Date Due    Hepatitis C Screening  1955    Lipid Panel  1955    TETANUS VACCINE  1972    Colonoscopy  2004    Pneumococcal Vaccine (65+ Low/Medium Risk) (1 of 2 - PCV13) 2019          Objective:     Last 24 Hour Vital Signs:  BP  Min: 143/88  Max: 224/136  Temp  Av.9 °F (36.6 °C)  Min: 97.9 °F (36.6 °C)  Max: 97.9 °F (36.6 °C)  Pulse  Av.4  Min: 61  Max: 108  Resp  Av.1  Min: 18  Max: 31  SpO2  Av.6 %  Min: 95 %  Max: 100 %  Height  Av' (182.9 cm)  Min: 6' (182.9 cm)  Max: 6' (182.9 cm)  Weight  Av.6 kg (127 lb)  Min: 57.6 kg (127 lb)  Max: 57.6 kg (127 lb)  Body mass index is 17.22 kg/m².  No intake/output data recorded.    Physical Examination:  BP (!) 184/108   Pulse 69   Temp 97.9 °F (36.6 °C) (Oral)   Resp (!) 28   Ht 6' (1.829 m)   Wt 57.6 kg (127 lb)   SpO2 95%   BMI 17.22 kg/m²     Physical Exam   Constitutional: He is oriented to person, place, and time. No distress.   Thin appearing male, with bilateral hand tremor    HENT:   Head: Normocephalic and atraumatic.   Mouth/Throat: Oropharynx is clear and moist.   Eyes: Conjunctivae and EOM are normal. No scleral icterus.   Neck: Neck supple. No tracheal deviation present.   Cardiovascular: Normal rate, regular rhythm and normal heart sounds. Exam reveals no gallop and no friction rub.   No murmur heard.  Pulmonary/Chest: Effort normal. No stridor. No respiratory distress.   Diffuse rhonchi throughout, worse on  right than left    Abdominal: Soft. Bowel sounds are normal. He exhibits no distension. There is no tenderness.   Musculoskeletal: Normal range of motion. He exhibits no edema.   Neurological: He is alert and oriented to person, place, and time.   Intention tremor in bilateral upper extremities     Skin: Skin is warm. No rash noted. He is not diaphoretic.   Psychiatric: Affect and judgment normal.   Nursing note and vitals reviewed.      Laboratory:  Most Recent Data:    Recent Labs   Lab 02/10/20  1515 02/10/20  2225   WBC 10.69  --    HGB 14.2  --    HCT 42.5  --    *  --    MCV 90  --    RDW 12.1  --      --    K 3.6  --      --    CO2 26  --    BUN 14  --    CREATININE 0.9  --    *  --    CALCIUM 9.7  --    MG  --  2.0   PHOS  --  2.7   PROT 8.0  --    ALBUMIN 3.2*  --    BILITOT 0.3  --    AST 34  --    ALKPHOS 104  --    ALT 54*  --        Coags:   Lab Results   Component Value Date    INR 1.0 02/10/2020       FLP: No results found for: CHOL, HDL, LDLCALC, TRIG, CHOLHDL    DM:   Lab Results   Component Value Date    HGBA1C 5.6 02/10/2020    HGBA1C 5.6 02/10/2020    CREATININE 0.9 02/10/2020       Thyroid:   Lab Results   Component Value Date    TSH 2.592 02/10/2020       Anemia: No results found for: IRON, TIBC, TRANSFERRIN, FERRITIN, AXWPRQII83, FOLATE    Cardiac:   Lab Results   Component Value Date    TROPONINI 0.008 02/10/2020    BNP 31 02/10/2020       Urinalysis:   Lab Results   Component Value Date    COLORU Brown (A) 02/10/2020    SPECGRAV >=1.030 (A) 02/10/2020    NITRITE Negative 02/10/2020    KETONESU Negative 02/10/2020    UROBILINOGEN Negative 02/10/2020    WBCUA 4-8 (A) 06/14/2012       Trended Cardiac Data:  Recent Labs   Lab 02/10/20  1515   TROPONINI 0.008   BNP 31       Microbiology Data:  none    Other Laboratory Data:  CRP: 17.6  Quantiferon Gold: pending  Histoplasma antigen: pending      Radiology:  Imaging Results           CTA Chest Non-Coronary (PE Study)  (Final result)  Result time 02/10/20 19:59:29    Final result by Angel Patel MD (02/10/20 19:59:29)                 Impression:      This report was flagged in Epic as abnormal.    1. No pulmonary thromboembolism.  2. Pulmonary emphysematous change noting peribronchial thickening centrally, could reflect bronchial airway infection or inflammation.  3. Multifocal tree-in-bud type nodules with additional regions of consolidative opacity with central lucency, may reflect early cavitation.  The largest focus is within the right lower lobe.  Findings are nonspecific and could reflect sequela of multifocal infection, correlation is recommended.  Given background emphysema, malignancy can not be excluded in this setting.  Correlation with patient history and clinical symptoms is recommended.  Short-term follow-up, no greater than 3 months, is recommended to ensure resolution and assess for progression.  Comparison with any previous examinations would be helpful.  4. Several additional findings above.      Electronically signed by: Angel Patel MD  Date:    02/10/2020  Time:    19:59             Narrative:    EXAMINATION:  CTA CHEST NON CORONARY    CLINICAL HISTORY:  Chest pain, acute, PE suspected, intermed prob, positive D-dimer;    TECHNIQUE:  Low dose axial images, sagittal and coronal reformations were obtained from the thoracic inlet to the lung bases following the IV administration of 100 mL of Omnipaque 350.  Contrast timing was optimized to evaluate the pulmonary arteries.  MIP images were performed.    COMPARISON:  None    FINDINGS:  The structures at the base of the neck are grossly unremarkable.  There are a few scattered nonenlarged mediastinal lymph nodes.  The heart is not enlarged.  There is calcification in the distribution of the coronary arteries.  No pericardial effusion.  The thoracic aorta tapers normally.  There is atherosclerotic calcification of the aorta.  The visualized portions of the  kidneys, spleen, pancreas, gallbladder and liver are grossly unremarkable.  There is a minimal hiatal hernia.    The airways are patent, noting there is mild central peribronchial thickening.  There is bilateral emphysematous change.  There is atelectasis along the lateral aspect of the fissure on the right.  There is anterior right middle lobe atelectasis.  There is right basilar dependent atelectasis.  There are a few tree-in-bud type nodules within the periphery of the right lower lobe measuring up to 3-4 mm.  There is a focus of consolidation along the periphery of the right lower lobe with surrounding tree-in-bud type nodularity.  There is an additional focus of consolidative opacity with possible central lucency measuring 1.2 cm.  A similar although smaller focus is noted more cranially within the right lower lobe measuring 6-7 mm.  There is right apical atelectasis or scarring.  There are scattered left upper lobe pleural based pulmonary nodules measuring 3-4 mm.  There is consolidative opacity with central lucency along the fissure on the left within the left lower lobe, measuring 1.9 cm.  There is a calcified granuloma within the left lower lobe.  There are scattered foci of tree-in-bud nodularity along the periphery of the left lower lobe.  No pneumothorax.  No pleural effusion.    Bolus timing is adequate for evaluation of pulmonary thromboembolism.  No pulmonary arterial filling defect to the level of the segmental arteries bilaterally to suggest pulmonary thromboembolism.    There is osteopenia.  Degenerative changes are noted of the spine.  Multilevel thoracic spine vertebral bodies Schmorl's nodes noted.  No significant axillary lymphadenopathy.                                X-Ray Chest AP Portable (Final result)  Result time 02/10/20 15:47:16    Final result by Ramon Nation MD (02/10/20 15:47:16)                 Impression:      Ill-defined masslike opacity over the right lower lung zone.  While  this could represent a an area of focal consolidation/pneumonia, an underlying neoplasm could have a similar appearance and further evaluation with chest CT is recommended.  There is a small ill-defined nodule in the left lower lung zone which can also be evaluated with chest CT.    This report was flagged in Epic as abnormal.      Electronically signed by: Ramon Nation MD  Date:    02/10/2020  Time:    15:47             Narrative:    EXAMINATION:  XR CHEST AP PORTABLE    CLINICAL HISTORY:  SOB;    TECHNIQUE:  Single frontal view of the chest was performed.    COMPARISON:  06/15/2012    FINDINGS:  Cardiomediastinal silhouette is within normal limits.  Left lung is relatively clear and well expanded.  High density nodule in the left mid lung zone likely a granuloma.  There is an ill-defined nodule just inferior to this granuloma.  There is an ill-defined masslike opacity over the right lower lung zone measuring approximately 5 cm.  No evidence of pneumothorax or large pleural effusion.  Bones show degenerative changes.                                Current Medications:     Infusions:       Scheduled:   enoxaparin  40 mg Subcutaneous Daily    senna-docusate 8.6-50 mg  1 tablet Oral BID        PRN:  hydrALAZINE, metoprolol, sodium chloride 0.9%    Antibiotics and Day Number of Therapy:  None    Lines and Day Number of Therapy:  PIV     Assessment:     Chuy Stevens is a 64 y.o.male with newly diagnosed pulmonary nodule.     Patient Active Problem List    Diagnosis Date Noted    Cachexia 02/10/2020    Pulmonary nodules 02/10/2020    Hypertension 02/10/2020    Fatigue 02/10/2020    Abnormal chest x-ray 02/10/2020        Plan:     Abnormal CT chest   - CXR and CT chest showed patchy areas of consolidation and single discrete nodule  - low suspicion for TB or NTM, would not recommend sputum AFBs  - imaging most consistent with CAP and prior granulomatous disease   - recommend treating CAP with PO abx; outpt  PFTs; repeat CT chest in 3 months  - discharge with albuterol inhaler or other short acting bronchodilator    Hypertension  - BP on presentation was 224/136  - BP this AM was 184/108  - pt no currently on any antihypertensives, would recommend started daily ACE    HM  - needs to establish care with primary care provider   - consult social work to assist pt with gaining health insurance       Yaquelin Arriaga  [facility] Internal Medicine-Pediatrics PGY-II  [facility] Pulmonology & Critical Care Team     Pt seen and examined with Pulmonary/Critical Care team and this note reviewed and validated with the following additional comments:    Emphysema on CT with hx suggestive of moderately severe COPD and recent influenza.  Presents with productive cough and multilobar infiltrates. I suspect CAP. Some areas that resemble cavitation may be infiltrate in areas of emphysema. Empiric abx appropriate. Bronchodilators, steroids. Will follow with you.    Mohamud Bravo MD  Phone [phone]

## 2020-02-11 NOTE — ASSESSMENT & PLAN NOTE
- Patient hypertensive in ED to , decreased to 174 with metoprolol  - PRN hydralazine for SBP>180  - Started Amlodipine

## 2020-02-11 NOTE — ED NOTES
Received report from Xenia GUILLERMO. Pt resting on stretcher with eyes closed. Awakens to name. On cardiac monitor. SR up and CB in reach. Admit MDs at bedside. No distress noted. Denies shortness of breath or chest pain.

## 2020-02-11 NOTE — PLAN OF CARE
Problem: Physical Therapy Goal  Goal: Physical Therapy Goal  Description  Goals to be met by: 3/11/2020     Patient will increase functional independence with mobility by performin. Supine to sit with Modified [place]  2. Sit to stand transfer with Modified [place]  3. Gait  x >200 feet with [place] using most appropriate AD or no AD.      Outcome: Ongoing, Progressing   Recommend Home with HH  May need shower chair

## 2020-02-12 VITALS
OXYGEN SATURATION: 95 % | DIASTOLIC BLOOD PRESSURE: 96 MMHG | SYSTOLIC BLOOD PRESSURE: 120 MMHG | WEIGHT: 117.31 LBS | HEIGHT: 72 IN | HEART RATE: 103 BPM | TEMPERATURE: 98 F | BODY MASS INDEX: 15.89 KG/M2 | RESPIRATION RATE: 21 BRPM

## 2020-02-12 LAB
ALBUMIN SERPL BCP-MCNC: 3 G/DL (ref 3.5–5.2)
ALP SERPL-CCNC: 89 U/L (ref 55–135)
ALT SERPL W/O P-5'-P-CCNC: 49 U/L (ref 10–44)
ANION GAP SERPL CALC-SCNC: 9 MMOL/L (ref 8–16)
AST SERPL-CCNC: 31 U/L (ref 10–40)
BASOPHILS # BLD AUTO: 0.05 K/UL (ref 0–0.2)
BASOPHILS NFR BLD: 0.6 % (ref 0–1.9)
BILIRUB SERPL-MCNC: 0.5 MG/DL (ref 0.1–1)
BUN SERPL-MCNC: 14 MG/DL (ref 8–23)
CALCIUM SERPL-MCNC: 9.7 MG/DL (ref 8.7–10.5)
CHLORIDE SERPL-SCNC: 106 MMOL/L (ref 95–110)
CO2 SERPL-SCNC: 25 MMOL/L (ref 23–29)
CREAT SERPL-MCNC: 0.8 MG/DL (ref 0.5–1.4)
DIFFERENTIAL METHOD: ABNORMAL
EOSINOPHIL # BLD AUTO: 0.2 K/UL (ref 0–0.5)
EOSINOPHIL NFR BLD: 2.2 % (ref 0–8)
ERYTHROCYTE [DISTWIDTH] IN BLOOD BY AUTOMATED COUNT: 12.5 % (ref 11.5–14.5)
EST. GFR  (AFRICAN AMERICAN): >60 ML/MIN/1.73 M^2
EST. GFR  (NON AFRICAN AMERICAN): >60 ML/MIN/1.73 M^2
GAMMA INTERFERON BACKGROUND BLD IA-ACNC: 1.4 IU/ML
GLUCOSE SERPL-MCNC: 87 MG/DL (ref 70–110)
HCT VFR BLD AUTO: 39.2 % (ref 40–54)
HGB BLD-MCNC: 13 G/DL (ref 14–18)
IMM GRANULOCYTES # BLD AUTO: 0.03 K/UL (ref 0–0.04)
IMM GRANULOCYTES NFR BLD AUTO: 0.3 % (ref 0–0.5)
LYMPHOCYTES # BLD AUTO: 2 K/UL (ref 1–4.8)
LYMPHOCYTES NFR BLD: 23.1 % (ref 18–48)
M TB IFN-G CD4+ BCKGRND COR BLD-ACNC: -0.56 IU/ML
MAGNESIUM SERPL-MCNC: 2.1 MG/DL (ref 1.6–2.6)
MCH RBC QN AUTO: 30.4 PG (ref 27–31)
MCHC RBC AUTO-ENTMCNC: 33.2 G/DL (ref 32–36)
MCV RBC AUTO: 92 FL (ref 82–98)
MITOGEN IGNF BCKGRD COR BLD-ACNC: 4.81 IU/ML
MONOCYTES # BLD AUTO: 0.7 K/UL (ref 0.3–1)
MONOCYTES NFR BLD: 8.2 % (ref 4–15)
NEUTROPHILS # BLD AUTO: 5.6 K/UL (ref 1.8–7.7)
NEUTROPHILS NFR BLD: 65.6 % (ref 38–73)
NRBC BLD-RTO: 0 /100 WBC
PHOSPHATE SERPL-MCNC: 3.6 MG/DL (ref 2.7–4.5)
PLATELET # BLD AUTO: 495 K/UL (ref 150–350)
PMV BLD AUTO: 10 FL (ref 9.2–12.9)
POCT GLUCOSE: 116 MG/DL (ref 70–110)
POCT GLUCOSE: 91 MG/DL (ref 70–110)
POTASSIUM SERPL-SCNC: 3.7 MMOL/L (ref 3.5–5.1)
PROT SERPL-MCNC: 7.2 G/DL (ref 6–8.4)
RBC # BLD AUTO: 4.27 M/UL (ref 4.6–6.2)
SODIUM SERPL-SCNC: 140 MMOL/L (ref 136–145)
TB GOLD PLUS: NEGATIVE
TB2 - NIL: -0.23 IU/ML
WBC # BLD AUTO: 8.58 K/UL (ref 3.9–12.7)

## 2020-02-12 PROCEDURE — 25000242 PHARM REV CODE 250 ALT 637 W/ HCPCS: Performed by: STUDENT IN AN ORGANIZED HEALTH CARE EDUCATION/TRAINING PROGRAM

## 2020-02-12 PROCEDURE — 36415 COLL VENOUS BLD VENIPUNCTURE: CPT

## 2020-02-12 PROCEDURE — 85025 COMPLETE CBC W/AUTO DIFF WBC: CPT

## 2020-02-12 PROCEDURE — 63600175 PHARM REV CODE 636 W HCPCS: Performed by: FAMILY MEDICINE

## 2020-02-12 PROCEDURE — 94761 N-INVAS EAR/PLS OXIMETRY MLT: CPT

## 2020-02-12 PROCEDURE — 90471 IMMUNIZATION ADMIN: CPT | Performed by: FAMILY MEDICINE

## 2020-02-12 PROCEDURE — 63600175 PHARM REV CODE 636 W HCPCS: Performed by: STUDENT IN AN ORGANIZED HEALTH CARE EDUCATION/TRAINING PROGRAM

## 2020-02-12 PROCEDURE — 80053 COMPREHEN METABOLIC PANEL: CPT

## 2020-02-12 PROCEDURE — 97116 GAIT TRAINING THERAPY: CPT

## 2020-02-12 PROCEDURE — 25000003 PHARM REV CODE 250: Performed by: STUDENT IN AN ORGANIZED HEALTH CARE EDUCATION/TRAINING PROGRAM

## 2020-02-12 PROCEDURE — 84100 ASSAY OF PHOSPHORUS: CPT

## 2020-02-12 PROCEDURE — 90662 IIV NO PRSV INCREASED AG IM: CPT | Performed by: FAMILY MEDICINE

## 2020-02-12 PROCEDURE — 94640 AIRWAY INHALATION TREATMENT: CPT

## 2020-02-12 PROCEDURE — 83735 ASSAY OF MAGNESIUM: CPT

## 2020-02-12 RX ORDER — LEVOFLOXACIN 750 MG/1
750 TABLET ORAL DAILY
Qty: 5 TABLET | Refills: 0 | Status: SHIPPED | OUTPATIENT
Start: 2020-02-12 | End: 2020-02-17

## 2020-02-12 RX ORDER — AMLODIPINE BESYLATE 10 MG/1
10 TABLET ORAL DAILY
Qty: 30 TABLET | Refills: 11 | Status: SHIPPED | OUTPATIENT
Start: 2020-02-12 | End: 2021-10-19

## 2020-02-12 RX ORDER — LEVOFLOXACIN 750 MG/1
750 TABLET ORAL DAILY
Status: DISCONTINUED | OUTPATIENT
Start: 2020-02-12 | End: 2020-02-12 | Stop reason: HOSPADM

## 2020-02-12 RX ORDER — ALBUTEROL SULFATE 90 UG/1
2 AEROSOL, METERED RESPIRATORY (INHALATION) EVERY 6 HOURS PRN
Qty: 18 G | Refills: 1 | Status: SHIPPED | OUTPATIENT
Start: 2020-02-12

## 2020-02-12 RX ORDER — PREDNISONE 20 MG/1
40 TABLET ORAL DAILY
Qty: 10 TABLET | Refills: 0 | Status: SHIPPED | OUTPATIENT
Start: 2020-02-12 | End: 2020-02-17

## 2020-02-12 RX ORDER — PREDNISONE 20 MG/1
40 TABLET ORAL DAILY
Status: DISCONTINUED | OUTPATIENT
Start: 2020-02-12 | End: 2020-02-12 | Stop reason: HOSPADM

## 2020-02-12 RX ADMIN — AMLODIPINE BESYLATE 10 MG: 5 TABLET ORAL at 09:02

## 2020-02-12 RX ADMIN — STANDARDIZED SENNA CONCENTRATE AND DOCUSATE SODIUM 1 TABLET: 8.6; 5 TABLET ORAL at 09:02

## 2020-02-12 RX ADMIN — IPRATROPIUM BROMIDE AND ALBUTEROL SULFATE 3 ML: .5; 3 SOLUTION RESPIRATORY (INHALATION) at 08:02

## 2020-02-12 RX ADMIN — IPRATROPIUM BROMIDE AND ALBUTEROL SULFATE 3 ML: .5; 3 SOLUTION RESPIRATORY (INHALATION) at 11:02

## 2020-02-12 RX ADMIN — INFLUENZA A VIRUS A/MICHIGAN/45/2015 X-275 (H1N1) ANTIGEN (FORMALDEHYDE INACTIVATED), INFLUENZA A VIRUS A/SINGAPORE/INFIMH-16-0019/2016 IVR-186 (H3N2) ANTIGEN (FORMALDEHYDE INACTIVATED), AND INFLUENZA B VIRUS B/MARYLAND/15/2016 BX-69A (A B/COLORADO/6/2017-LIKE VIRUS) ANTIGEN (FORMALDEHYDE INACTIVATED) 0.5 ML: 60; 60; 60 INJECTION, SUSPENSION INTRAMUSCULAR at 12:02

## 2020-02-12 RX ADMIN — LEVOFLOXACIN 750 MG: 750 TABLET, FILM COATED ORAL at 09:02

## 2020-02-12 RX ADMIN — PREDNISONE 40 MG: 20 TABLET ORAL at 09:02

## 2020-02-12 NOTE — SUBJECTIVE & OBJECTIVE
"Interval History: Patient seen and examined this morning. No acute events overnight. Patient reports that he is feeling "great" this morning; no difficulty breathing, shortness of breath, or coughing. Patient denied any other complaints at this time.       Review of Systems   Constitutional: Negative for appetite change, chills, fatigue, fever and unexpected weight change.   HENT: Positive for dental problem. Negative for rhinorrhea, sinus pressure, sinus pain, sneezing and sore throat.    Respiratory: Negative for cough, chest tightness, shortness of breath and wheezing.         Breathing much improved   Cardiovascular: Negative for chest pain and palpitations.   Gastrointestinal: Negative for abdominal distention, abdominal pain, blood in stool, constipation, diarrhea, nausea and vomiting.   Genitourinary: Negative for dysuria, flank pain, frequency, hematuria and urgency.   Musculoskeletal: Negative for arthralgias, back pain and myalgias.   Skin: Negative for color change and rash.   Neurological: Negative for dizziness, weakness, light-headedness, numbness and headaches.        No more dizziness, light-headedness   Psychiatric/Behavioral: Negative for behavioral problems, confusion, decreased concentration and sleep disturbance. The patient is not nervous/anxious.      Objective:     Vital Signs (Most Recent):  Temp: 97.3 °F (36.3 °C) (02/12/20 0849)  Pulse: 94 (02/12/20 0849)  Resp: 18 (02/12/20 0849)  BP: 119/88 (02/12/20 0849)  SpO2: 95 % (02/12/20 0810) Vital Signs (24h Range):  Temp:  [97.3 °F (36.3 °C)-98.7 °F (37.1 °C)] 97.3 °F (36.3 °C)  Pulse:  [] 94  Resp:  [17-28] 18  SpO2:  [92 %-95 %] 95 %  BP: (119-165)/() 119/88     Weight: 53.2 kg (117 lb 4.6 oz)  Body mass index is 15.91 kg/m².    Intake/Output Summary (Last 24 hours) at 2/12/2020 0934  Last data filed at 2/12/2020 0600  Gross per 24 hour   Intake 530 ml   Output 550 ml   Net -20 ml      Physical Exam   Constitutional: He is " oriented to person, place, and time. No distress.   Cachectic   HENT:   Head: Normocephalic.   Poor dentition   Eyes: Pupils are equal, round, and reactive to light. Conjunctivae and EOM are normal.   Neck: Normal range of motion. Neck supple. No JVD present.   Cardiovascular: Normal rate, regular rhythm, normal heart sounds and intact distal pulses.   No murmur heard.  Pulmonary/Chest: Effort normal and breath sounds normal. No respiratory distress. He has no wheezes.   Improved from yesterday   Abdominal: Soft. Bowel sounds are normal. He exhibits no distension and no mass. There is no tenderness.   Musculoskeletal: Normal range of motion. He exhibits no edema.   Neurological: He is alert and oriented to person, place, and time.   Skin: Skin is warm and dry. Capillary refill takes less than 2 seconds. He is not diaphoretic.   Psychiatric: He has a normal mood and affect. His behavior is normal.       Significant Labs:   CBC:   Recent Labs   Lab 02/10/20  1515 02/11/20  0802 02/12/20  0502   WBC 10.69 10.15 8.58   HGB 14.2 13.5* 13.0*   HCT 42.5 39.9* 39.2*   * 532* 495*     CMP:   Recent Labs   Lab 02/10/20  1515 02/11/20  0803 02/12/20  0502    139 140   K 3.6 4.1 3.7    104 106   CO2 26 26 25   * 97 87   BUN 14 14 14   CREATININE 0.9 0.8 0.8   CALCIUM 9.7 9.5 9.7   PROT 8.0 7.4 7.2   ALBUMIN 3.2* 3.0* 3.0*   BILITOT 0.3 0.5 0.5   ALKPHOS 104 94 89   AST 34 33 31   ALT 54* 50* 49*   ANIONGAP 10 9 9   EGFRNONAA >60 >60 >60       Significant Imaging: I have reviewed and interpreted all pertinent imaging results/findings within the past 24 hours.

## 2020-02-12 NOTE — PROGRESS NOTES
Follow-up With  Details  Why  Contact Info     On 2/17/2020  2:00 pm [address], [place] [address] p. [phone] f. [phone] please bring your ID and two paycheck stubs or proof of income . You will be charged according to your ability to pay.      [facility] - SCHEDULING    the Pulmonology Dept will call you with an apt ; fax # [phone]   [address] [place] [address] [id number] [phone]

## 2020-02-12 NOTE — PLAN OF CARE
Pts safety maintained, airborne precautions continued. Headache relieved with Motrin. Pt seems anxious and overly animated, but stating he is feeling great, and is excited to go home. No N/V, SOB, distress, Sinus tach in 170 at one time, otherwise vitals stable through the night.

## 2020-02-12 NOTE — PLAN OF CARE
pt for d/c to home today   Discharge rounds on patient. Discussed followup appointments, blue discharge folder, discharge nurse will go over home medications and reasons for medications and final discharge instructions. All patient/caregiver questions answered. Patient verbalized understanding.         02/12/20 1132   Final Note   Assessment Type Final Discharge Note   Anticipated Discharge Disposition Home   What phone number can be called within the next 1-3 days to see how you are doing after discharge? [phone]   Hospital Follow Up  Appt(s) scheduled? Yes   Discharge plans and expectations educations in teach back method with documentation complete? Yes   Right Care Referral Info   Post Acute Recommendation No Care   Referral Type   (no care )

## 2020-02-12 NOTE — PLAN OF CARE
Patient has received discharge instructions. Prescriptions received. Instructions reviewed with pt using teachback method. All questions answered to pt satisfaction. IV access and telemetry removed per floor nurse. Transport requested for discharge.

## 2020-02-12 NOTE — PLAN OF CARE
Problem: Physical Therapy Goal  Goal: Physical Therapy Goal  Description  Goals to be met by: 3/11/2020     Patient will increase functional independence with mobility by performin. Supine to sit with Modified [place]  2. Sit to stand transfer with Modified [place]  3. Gait  x >200 feet with [place] using most appropriate AD or no AD.      Outcome: Met   Patient back to his functional mobility baseline. Gait and transitional movements independent.  No acute skilled PT needs  Will DC PT service.

## 2020-02-12 NOTE — PROGRESS NOTES
Follow-Up       Follow-up With  Details  Why  Contact Info     On 2/17/2020  [place] siddharth Navarro 2:00 pm [address], [place] [address] p. [phone] f. [phone] please bring your ID and two paycheck stubs or proof of income . You will be charged according to your ability to pay.      [facility] - Scheduling    the Pulmonology Dept will call you with an apt ; fax # [phone]   [address] [place] [id number] [phone]

## 2020-02-12 NOTE — PROGRESS NOTES
[facility] Internal Medicine Consult Note - Resident Note  Patient: Chuy Stevens   MRN: [id number]      Admitting Team: [facility] Hospitalist Team   Attending Physician: Dr. Marcelo Stewart MD  Consulting Attending: Eleanor  Consulting Fellow: Hien    Date of Admit: 2/10/2020      Subjective:      Pt did well overnight, no acute events. He states he breathing has improved with nebulized treatments, currently on room air. Denies chest pain, SOB, n/v/d, vision changes or weakness.      Objective:     Last 24 Hour Vital Signs:  BP  Min: 120/93  Max: 201/119  Temp  Av.9 °F (36.6 °C)  Min: 97.3 °F (36.3 °C)  Max: 98.7 °F (37.1 °C)  Pulse  Av.5  Min: 72  Max: 112  Resp  Av.3  Min: 17  Max: 33  SpO2  Av.8 %  Min: 92 %  Max: 95 %  Weight  Av.2 kg (117 lb 4.6 oz)  Min: 53.2 kg (117 lb 4.6 oz)  Max: 53.2 kg (117 lb 4.6 oz)  Body mass index is 15.91 kg/m².  I/O last 3 completed shifts:  In: 530 [P.O.:180; I.V.:50; IV Piggyback:300]  Out: 950 [Urine:950]    Physical Examination:  BP (!) 120/93 (BP Location: Right arm, Patient Position: Lying)   Pulse 78   Temp 97.3 °F (36.3 °C) (Oral)   Resp 19   Ht 6' (1.829 m)   Wt 53.2 kg (117 lb 4.6 oz)   SpO2 95%   BMI 15.91 kg/m²     Physical Exam   Constitutional: He is oriented to person, place, and time. No distress.   Thin appearing male, with bilateral hand tremor    Neck: Neck supple.   Cardiovascular: Normal rate, regular rhythm and normal heart sounds. Exam reveals no gallop and no friction rub.   No murmur heard.  Pulmonary/Chest: Effort normal. No respiratory distress. He has no wheezes. He has no rales.   Decreased breath sounds bilaterally, worse on left than right   Abdominal: Soft. Bowel sounds are normal. He exhibits no distension. There is no tenderness.   Musculoskeletal: Normal range of motion. He exhibits no edema.   Neurological: He is alert and oriented to person, place, and time.   Intention tremor in bilateral upper extremities      Skin: Skin is warm. No rash noted. He is not diaphoretic.   Psychiatric: Affect and judgment normal.   Nursing note and vitals reviewed.      Laboratory:  Most Recent Data:    Recent Labs   Lab 02/10/20  1515 02/10/20  2225 02/11/20  0802 02/11/20  0803 02/12/20  0502   WBC 10.69  --  10.15  --  8.58   HGB 14.2  --  13.5*  --  13.0*   HCT 42.5  --  39.9*  --  39.2*   *  --  532*  --  495*   MCV 90  --  90  --  92   RDW 12.1  --  12.2  --  12.5     --   --  139 140   K 3.6  --   --  4.1 3.7     --   --  104 106   CO2 26  --   --  26 25   BUN 14  --   --  14 14   CREATININE 0.9  --   --  0.8 0.8   *  --   --  97 87   CALCIUM 9.7  --   --  9.5 9.7   MG  --  2.0  --  2.1 2.1   PHOS  --  2.7  --  2.9 3.6   PROT 8.0  --   --  7.4 7.2   ALBUMIN 3.2*  --   --  3.0* 3.0*   BILITOT 0.3  --   --  0.5 0.5   AST 34  --   --  33 31   ALKPHOS 104  --   --  94 89   ALT 54*  --   --  50* 49*       Coags:   Lab Results   Component Value Date    INR 1.0 02/10/2020       FLP: No results found for: CHOL, HDL, LDLCALC, TRIG, CHOLHDL    DM:   Lab Results   Component Value Date    HGBA1C 5.6 02/10/2020    HGBA1C 5.6 02/10/2020    CREATININE 0.8 02/12/2020       Thyroid:   Lab Results   Component Value Date    TSH 2.592 02/10/2020       Anemia: No results found for: IRON, TIBC, TRANSFERRIN, FERRITIN, [id number], FOLATE    Cardiac:   Lab Results   Component Value Date    TROPONINI 0.008 02/10/2020    BNP 31 02/10/2020       Urinalysis:   Lab Results   Component Value Date    COLORU Brown (A) 02/10/2020    SPECGRAV >=1.030 (A) 02/10/2020    NITRITE Negative 02/10/2020    KETONESU Negative 02/10/2020    UROBILINOGEN Negative 02/10/2020    WBCUA 4-8 (A) 06/14/2012       Trended Cardiac Data:  Recent Labs   Lab 02/10/20  1515   TROPONINI 0.008   BNP 31       Microbiology Data:  none    Other Laboratory Data:  CRP: 17.6  Quantiferon Gold: pending  Histoplasma antigen: pending    ECG Results          EKG 12-lead (Final  result)  Result time 02/11/20 09:59:28    Final result by Interface, Lab In [facility] (02/11/20 09:59:28)                 Narrative:    Test Reason :     Vent. Rate : 068 BPM     Atrial Rate : 068 BPM     P-R Int : 156 ms          QRS Dur : 084 ms      QT Int : 440 ms       P-R-T Axes : 066 073 064 degrees     QTc Int : 467 ms    Normal sinus rhythm  Normal ECG  When compared with ECG of 10-FEB-2020 15:08,  No significant change was found  Confirmed by Terry Barnes MD ([id number]) on 2/11/2020 9:59:20 AM    Referred By: VIDAL   SELF           Confirmed By:Terry Barnes MD                             EKG 12-lead (Final result)  Result time 02/11/20 08:45:30    Final result by Interface, Lab In [facility] (02/11/20 08:45:30)                 Narrative:    Test Reason : R06.02,    Vent. Rate : 091 BPM     Atrial Rate : 091 BPM     P-R Int : 146 ms          QRS Dur : 088 ms      QT Int : 396 ms       P-R-T Axes : 078 075 072 degrees     QTc Int : 487 ms    Normal sinus rhythm  Prolonged QT  Abnormal ECG  No previous ECGs available  Confirmed by Luis M Sun III, MD ([id number]) on 2/11/2020 8:45:19 AM    Referred By: VIDAL   SELF           Confirmed By:Luis M Sun III, MD                            Radiology:  Imaging Results           CTA Chest Non-Coronary (PE Study) (Final result)  Result time 02/10/20 19:59:29    Final result by Angel Patel MD (02/10/20 19:59:29)                 Impression:      This report was flagged in Epic as abnormal.    1. No pulmonary thromboembolism.  2. Pulmonary emphysematous change noting peribronchial thickening centrally, could reflect bronchial airway infection or inflammation.  3. Multifocal tree-in-bud type nodules with additional regions of consolidative opacity with central lucency, may reflect early cavitation.  The largest focus is within the right lower lobe.  Findings are nonspecific and could reflect sequela of multifocal infection, correlation is recommended.   Given background emphysema, malignancy can not be excluded in this setting.  Correlation with patient history and clinical symptoms is recommended.  Short-term follow-up, no greater than 3 months, is recommended to ensure resolution and assess for progression.  Comparison with any previous examinations would be helpful.  4. Several additional findings above.      Electronically signed by: Angel Patel MD  Date:    02/10/2020  Time:    19:59             Narrative:    EXAMINATION:  CTA CHEST NON CORONARY    CLINICAL HISTORY:  Chest pain, acute, PE suspected, intermed prob, positive D-dimer;    TECHNIQUE:  Low dose axial images, sagittal and coronal reformations were obtained from the thoracic inlet to the lung bases following the IV administration of 100 mL of Omnipaque 350.  Contrast timing was optimized to evaluate the pulmonary arteries.  MIP images were performed.    COMPARISON:  None    FINDINGS:  The structures at the base of the neck are grossly unremarkable.  There are a few scattered nonenlarged mediastinal lymph nodes.  The heart is not enlarged.  There is calcification in the distribution of the coronary arteries.  No pericardial effusion.  The thoracic aorta tapers normally.  There is atherosclerotic calcification of the aorta.  The visualized portions of the kidneys, spleen, pancreas, gallbladder and liver are grossly unremarkable.  There is a minimal hiatal hernia.    The airways are patent, noting there is mild central peribronchial thickening.  There is bilateral emphysematous change.  There is atelectasis along the lateral aspect of the fissure on the right.  There is anterior right middle lobe atelectasis.  There is right basilar dependent atelectasis.  There are a few tree-in-bud type nodules within the periphery of the right lower lobe measuring up to 3-4 mm.  There is a focus of consolidation along the periphery of the right lower lobe with surrounding tree-in-bud type nodularity.  There is an  additional focus of consolidative opacity with possible central lucency measuring 1.2 cm.  A similar although smaller focus is noted more cranially within the right lower lobe measuring 6-7 mm.  There is right apical atelectasis or scarring.  There are scattered left upper lobe pleural based pulmonary nodules measuring 3-4 mm.  There is consolidative opacity with central lucency along the fissure on the left within the left lower lobe, measuring 1.9 cm.  There is a calcified granuloma within the left lower lobe.  There are scattered foci of tree-in-bud nodularity along the periphery of the left lower lobe.  No pneumothorax.  No pleural effusion.    Bolus timing is adequate for evaluation of pulmonary thromboembolism.  No pulmonary arterial filling defect to the level of the segmental arteries bilaterally to suggest pulmonary thromboembolism.    There is osteopenia.  Degenerative changes are noted of the spine.  Multilevel thoracic spine vertebral bodies Schmorl's nodes noted.  No significant axillary lymphadenopathy.                                X-Ray Chest AP Portable (Final result)  Result time 02/10/20 15:47:16    Final result by Ramon Nation MD (02/10/20 15:47:16)                 Impression:      Ill-defined masslike opacity over the right lower lung zone.  While this could represent a an area of focal consolidation/pneumonia, an underlying neoplasm could have a similar appearance and further evaluation with chest CT is recommended.  There is a small ill-defined nodule in the left lower lung zone which can also be evaluated with chest CT.    This report was flagged in Epic as abnormal.      Electronically signed by: Ramon Nation MD  Date:    02/10/2020  Time:    15:47             Narrative:    EXAMINATION:  XR CHEST AP PORTABLE    CLINICAL HISTORY:  SOB;    TECHNIQUE:  Single frontal view of the chest was performed.    COMPARISON:  06/15/2012    FINDINGS:  Cardiomediastinal silhouette is within normal  limits.  Left lung is relatively clear and well expanded.  High density nodule in the left mid lung zone likely a granuloma.  There is an ill-defined nodule just inferior to this granuloma.  There is an ill-defined masslike opacity over the right lower lung zone measuring approximately 5 cm.  No evidence of pneumothorax or large pleural effusion.  Bones show degenerative changes.                                Current Medications:     Infusions:       Scheduled:   albuterol-ipratropium  3 mL Nebulization Q4H WAKE    amLODIPine  10 mg Oral Daily    azithromycin  500 mg Intravenous Q24H    cefTRIAXone (ROCEPHIN) IVPB  1 g Intravenous Q24H    enoxaparin  40 mg Subcutaneous Daily    predniSONE  40 mg Oral Daily    senna-docusate 8.6-50 mg  1 tablet Oral BID        PRN:  hydrALAZINE, ibuprofen, influenza, melatonin, metoprolol, pneumoc 13-vanessa conj-dip cr(PF), sodium chloride 0.9%    Antibiotics and Day Number of Therapy:  Ceftriaxone 2/11-present  Azithromycin 2/11-present    Lines and Day Number of Therapy:  PIV     Assessment:     Chuy Stevens is a 64 y.o.male with likely undiagnosed COPD complicated by community acquired pneumonia.    Patient Active Problem List    Diagnosis Date Noted    Pneumonia 02/11/2020    Cachexia 02/10/2020    Pulmonary nodules 02/10/2020    Hypertension 02/10/2020    Fatigue 02/10/2020    Abnormal chest x-ray 02/10/2020        Plan:     Bacterial Pneumonia   - CXR and CT chest showed patchy areas of consolidation and single discrete nodule  - low suspicion for TB or NTM, would not recommend sputum AFBs  - imaging most consistent with CAP and prior granulomatous disease   - recommend continued treating CAP, can use PO abx   - outpt PFTs; repeat noncontrast CT chest in 3 months  - discharge with albuterol inhaler or other short acting bronchodilator  - prednisone 40 mg PO for a total of 5 day course  - can discharge from our stand point  - follow up on IGRA    Hypertension  -  BP on presentation was 224/136  - BP this AM was 120/93  - continue amlodipine 10 mg daily       - needs to establish care with primary care provider   - consult social work to assist pt with gaining health insurance       Yaquelin Arriaga  [facility] Internal Medicine-Pediatrics PGY-II  [facility] Pulmonology & Critical Care Team     Pt seen and examined with Pulmonary/Critical Care team and this note reviewed and validated with the following additional comments:    Symptomatically much improved. HIV negative.  IgRA negative. Should be able to transition to p.o. abx. Will need out-pt F/U with CXR in about 4-6 wks to make sure of complete resolution. OK to home with po abx and bronchodilators..    Mohamud Bravo MD  Phone [phone]

## 2020-02-12 NOTE — DISCHARGE SUMMARY
Discharge Summary      Admit Date: 2/10/2020    Discharge Date and Time: 02/12/2020    Attending Physician: Marcelo Stewart MD     Discharge Physician: Mookie Palencia MD     Principal Diagnoses: Pulmonary nodules  The primary encounter diagnosis was Abnormal chest x-ray. Diagnoses of SOB (shortness of breath), Chest pain, Pneumonia, and Pulmonary nodules were also pertinent to this visit.    Discharged Condition: stable    Hospital Course: Chuy Stevens is a 64 y.o. male with pmh  has no past medical history on file. who presented with dyspnea on exertion for the previous month along with flu-like symptoms and a productive cough which was associate weight los, subjective fevers, and chills. He has no exposure to TB contacts, prisons, travel, homeless shelters, or healthcare exposure. A CT was ordered which showed multifocal tree-in-bud type nodules with additional regions of consolidative opacity with central lucency, may reflect early cavitation.  The largest focus is within the right lower lobe.  Findings are nonspecific and could reflect sequela of multifocal infection. He was started on IV antibiotics and was put in isolation along with steroids. He was a smoker for years. Denied hemoptysis. He had a wbc of 10.69 with a normocytic anemia of 13/39 with a elevated ESR of 85 and CRP of 17.6 with normal lytes and a creatinine of 0.9.  He had a negative HIV, RPR, and hepatitis.  He was stable on room air and pulm was consulted. Pulm reported that he likely had aspiration PNA and he could be d/c on oral abx with follow up CT 3 months later. He will also follow up with his PCP. He was educated on smoking cessation.     Consults: IP CONSULT TO SOCIAL WORK/CASE MANAGEMENT  IP CONSULT TO PULMONOLOGY  IP CONSULT TO REGISTERED DIETITIAN/NUTRITIONIST  IP CONSULT TO SOCIAL WORK/CASE MANAGEMENT  IP CONSULT TO SOCIAL WORK/CASE MANAGEMENT  IP CONSULT TO SPIRITUAL CARE  IP CONSULT TO SPIRITUAL CARE      Disposition:  Home or Self Care    Patient Instructions:   Current Discharge Medication List      START taking these medications    Details   albuterol (PROVENTIL/VENTOLIN HFA) 90 mcg/actuation inhaler Inhale 2 puffs into the lungs every 6 (six) hours as needed for Wheezing.  Qty: 18 g, Refills: 1      amLODIPine (NORVASC) 10 MG tablet Take 1 tablet (10 mg total) by mouth once daily.  Qty: 30 tablet, Refills: 11      levoFLOXacin (LEVAQUIN) 750 MG tablet Take 1 tablet (750 mg total) by mouth once daily. for 5 days  Qty: 5 tablet, Refills: 0      predniSONE (DELTASONE) 20 MG tablet Take 2 tablets (40 mg total) by mouth once daily. for 5 days  Qty: 10 tablet, Refills: 0             Discharge Procedure Orders   Ambulatory referral/consult to Pulmonology   Standing Status: Future   Referral Priority: Routine Referral Type: Consultation   Referral Reason: Specialty Services Required   Requested Specialty: Pulmonary Disease   Number of Visits Requested: 1     Diet Adult Regular     Notify your health care provider if you experience any of the following:  temperature >100.4     Notify your health care provider if you experience any of the following:  persistent nausea and vomiting or diarrhea     Notify your health care provider if you experience any of the following:  severe uncontrolled pain     Notify your health care provider if you experience any of the following:  redness, tenderness, or signs of infection (pain, swelling, redness, odor or green/yellow discharge around incision site)     Notify your health care provider if you experience any of the following:  persistent dizziness, light-headedness, or visual disturbances     Notify your health care provider if you experience any of the following:  increased confusion or weakness     Notify your health care provider if you experience any of the following:  difficulty breathing or increased cough     Activity as tolerated     33 minutes was spent on this discharge summary.      Mookie Palencia MD   02/12/2020  9:44 AM

## 2020-02-12 NOTE — PROGRESS NOTES
"  Ambulatory referral/consult to Pulmonology [[id number]] (Order [id number])   Outpatient Referral   Date and Time: 2020  9:37 AM Department: [facility] Rel By/Authorizing: Mookie Palencia MD   Dept Order Information     Date Department   2020 [facility]   Order Information     Order Date/Time Release Date/Time Start Date/Time End Date/Time   20 09:37 AM None 2020 None   Order Details     Frequency Duration Priority Order Class   None None Routine Internal Referral   Quantity     Ordering Quantity   1   Quantity     Ordering Quantity Ordering Quantity   1 1   Order Details     Order ID   [id number]   Reprint Order Requisition     Ambulatory referral/consult to Pulmonology (Order #[id number]) on 20   Future Order Information     Expected Expires      2020 3/12/2021             Associated Diagnoses      ICD-10-CM ICD-9-CM   Abnormal chest x-ray  - Primary     R93.89 793.2   Pulmonary nodules     R91.8 793.19   Order Questions     Question Answer Comment   What is the reason for visit? Mass    Note:  This question is linked to a decision tree used for scheduling when applicable.          Process Instructions     Patient should be seen by the "Expected Date".    Collection Information     Acknowledgement Info     For At Acknowledged By Acknowledged On   Placing Order 20 0937 Malka Guardado LPN 2038   Patient Details   MRN:[id number]   Name Gender Identity  SSN   Chuy Hall Male 1955       Address Phone Email [employer]   [address] [place] [id number] Home: [phone]   Work:    Cell: [phone]     CHUY HALL      Inpatient Unit Inpatient Room Inpatient Bed    [facility] ACUTE K501 K501 A       PCP Allergies     Primary Doctor No No Known Allergies      Primary Coverage     Payer Plan Sponsor Code Group Number Group Name   No coverage found       Additional Information     Associated " Reports   Priority and Order Details   ADT-Related Order Information    Encounter     View Encounter             Order Provider Info         Office phone Pager E-mail   Ordering User Mookie Palencia [phone] -- [email or web address]   Authorizing Provider Mookie Palencia [phone] -- --   Attending Provider Marcelo Stewart [phone] -- --   Ambulatory referral/consult to Pulmonology [[id number]]     Electronically signed by: Mookie Palencia MD on 02/12/20 0937 Status: Active   Ordering user: Mookie Palencia MD 02/12/20 0937 Ordering provider: Mookie Palencia MD   Authorized by: Mookie Palencia MD Ordering mode: Standard   Frequency:  02/12/20 -     Acknowledged: Malka Guardado LPN 02/12/20 0938 for Placing Order   Diagnoses  Abnormal chest x-ray [R93.89]  Pulmonary nodules [R91.8]   Questionnaire     Question Answer   What is the reason for visit? Mass   Order Diagnosis: Abnormal chest x-ray [R93.89 (ICD-10-CM)]  Pulmonary nodules [R91.8 (ICD-10-CM)] CPT:                Electronically signed by: Mookie Palencia MD Lic # [id number] NPI: [id number]

## 2020-02-12 NOTE — PROGRESS NOTES
Chuy Stevens #[id number] (CSN: [id number]) (64 y.o. M) (Adm: 02/10/20)   [facility] [id number] A   PCP     NO, PRIMARY DOCTOR   Date of Birth     1955   Demographics     Address: Home Phone: Work Phone: Mobile Phone:     [address] [facility] [address] [phone]    SSN: Insurance: Marital Status: Religious:       Single No Religious    Admission Dx      Pneumonia    SOB (shortness of breath)    Abnormal chest x-ray    Chest pain   Chief Complaint     Complaint Comment   Shortness of Breath Reports SOB over the last 4 weeks. Worse with exertion. /136 at triage, pt using accessory muscles and displaying some difficulty with speaking completes sentences.    Documents Filed to Patient     Power of  Living Will Clinical Unknown Study Attachment Consent Form ABN Waiver After Visit Summary Lab Result Scan Code Status Patient Portal Status   Not on File Not on File Not on File Not on File Not on File Not on File Not on File Not on File FULL [Updated on 02/10/20 2131] Pending   Auth/Cert Information      Open Auth/Cert for Hospital Account [id number]      Admission Information     Attending Provider Admitting Provider Admission Type Admission Date/Time   MD Marcelo Mills MD Emergency 02/10/20  1452   Discharge Date Hospital Service Auth/Cert Status Service Area    Family Medicine [facility]   Unit Room/Bed Admission Status    [facility] MEDICAL SURGICAL UNIT ACUTE K501/K501 A Admission (Confirmed)    Hospital Account     Name Acct ID Class Status Primary Coverage   Chuy Stevens TATYANA [id number] IP- Inpatient Open None          Guarantor Account (for Hospital Account #[id number])     Name Relation to Pt Service Area Active? Acct Type   Chuy Stevens Self OHSSA Yes Personal/Family   Address Phone     [address] [place] [address] [phone](H)            Coverage Information (for Hospital Account #[id number])     Not on file           Emergency Contact Information     Name: NONE,NONE Relationship: Other   Address:     City:  State:  Zip:  Phone:     Business phone:

## 2020-02-12 NOTE — PROGRESS NOTES
"[facility] Hospital Medicine  Progress Note    Patient Name: Chuy Stevens  MRN: [id number]  Patient Class: IP- Inpatient   Admission Date: 2/10/2020  Length of Stay: 1 days  Attending Physician: Marcelo Stewart MD  Primary Care Provider: Primary Doctor No        Subjective:     Principal Problem:Pulmonary nodules        HPI:  Mr. Chuy Moran is a 65 yo man with no known PMH presenting to hospital for dyspnea on exertion over past 4 weeks. Patient BP was 244/136 at presentation, decreaed to 174/110 with metoprolol. Patient displayed some SOB. Patient DDimer was elevated, CTA negative for PE, but positive for nodules of unknown significance. Patient placed in isolation as TB precaution. Per patient, he smoked cigarettes from age 10 to age 27, and has since only smoked marijuana. Patient has used cocaine in the past, but denies IVDU. Patient's father  of liver cancer, and his brother was recently diagnosed. Patient is a  by trade, and has been active and exercising until for weeks ago when he had flu-like symptoms, cough productive of whitish sputum, and lost 25 lbs rapidly. His appetite has been decreased. Patient denies N/V, endorses chills and subjective fever. Patient admitted to hospital for further evaluation.     Overview/Hospital Course:  No notes on file    Interval History: Patient seen and examined this morning. No acute events overnight. Patient reports that he is feeling "great" this morning; no difficulty breathing, shortness of breath, or coughing. Patient denied any other complaints at this time.       Review of Systems   Constitutional: Negative for appetite change, chills, fatigue, fever and unexpected weight change.   HENT: Positive for dental problem. Negative for rhinorrhea, sinus pressure, sinus pain, sneezing and sore throat.    Respiratory: Negative for cough, chest tightness, shortness of breath and wheezing.         Breathing much improved "   Cardiovascular: Negative for chest pain and palpitations.   Gastrointestinal: Negative for abdominal distention, abdominal pain, blood in stool, constipation, diarrhea, nausea and vomiting.   Genitourinary: Negative for dysuria, flank pain, frequency, hematuria and urgency.   Musculoskeletal: Negative for arthralgias, back pain and myalgias.   Skin: Negative for color change and rash.   Neurological: Negative for dizziness, weakness, light-headedness, numbness and headaches.        No more dizziness, light-headedness   Psychiatric/Behavioral: Negative for behavioral problems, confusion, decreased concentration and sleep disturbance. The patient is not nervous/anxious.      Objective:     Vital Signs (Most Recent):  Temp: 97.3 °F (36.3 °C) (02/12/20 0849)  Pulse: 94 (02/12/20 0849)  Resp: 18 (02/12/20 0849)  BP: 119/88 (02/12/20 0849)  SpO2: 95 % (02/12/20 0810) Vital Signs (24h Range):  Temp:  [97.3 °F (36.3 °C)-98.7 °F (37.1 °C)] 97.3 °F (36.3 °C)  Pulse:  [] 94  Resp:  [17-28] 18  SpO2:  [92 %-95 %] 95 %  BP: (119-165)/() 119/88     Weight: 53.2 kg (117 lb 4.6 oz)  Body mass index is 15.91 kg/m².    Intake/Output Summary (Last 24 hours) at 2/12/2020 0934  Last data filed at 2/12/2020 0600  Gross per 24 hour   Intake 530 ml   Output 550 ml   Net -20 ml      Physical Exam   Constitutional: He is oriented to person, place, and time. No distress.   Cachectic   HENT:   Head: Normocephalic.   Poor dentition   Eyes: Pupils are equal, round, and reactive to light. Conjunctivae and EOM are normal.   Neck: Normal range of motion. Neck supple. No JVD present.   Cardiovascular: Normal rate, regular rhythm, normal heart sounds and intact distal pulses.   No murmur heard.  Pulmonary/Chest: Effort normal and breath sounds normal. No respiratory distress. He has no wheezes.   Improved from yesterday   Abdominal: Soft. Bowel sounds are normal. He exhibits no distension and no mass. There is no tenderness.    Musculoskeletal: Normal range of motion. He exhibits no edema.   Neurological: He is alert and oriented to person, place, and time.   Skin: Skin is warm and dry. Capillary refill takes less than 2 seconds. He is not diaphoretic.   Psychiatric: He has a normal mood and affect. His behavior is normal.       Significant Labs:   CBC:   Recent Labs   Lab 02/10/20  1515 02/11/20  0802 02/12/20  0502   WBC 10.69 10.15 8.58   HGB 14.2 13.5* 13.0*   HCT 42.5 39.9* 39.2*   * 532* 495*     CMP:   Recent Labs   Lab 02/10/20  1515 02/11/20  0803 02/12/20  0502    139 140   K 3.6 4.1 3.7    104 106   CO2 26 26 25   * 97 87   BUN 14 14 14   CREATININE 0.9 0.8 0.8   CALCIUM 9.7 9.5 9.7   PROT 8.0 7.4 7.2   ALBUMIN 3.2* 3.0* 3.0*   BILITOT 0.3 0.5 0.5   ALKPHOS 104 94 89   AST 34 33 31   ALT 54* 50* 49*   ANIONGAP 10 9 9   EGFRNONAA >60 >60 >60       Significant Imaging: I have reviewed and interpreted all pertinent imaging results/findings within the past 24 hours.      Assessment/Plan:      * Pulmonary nodules  - Concerning for malignancy versus infectious process  - Testing for Hep C, TB in process  - Pulmonary consulted, recs appreciated  - Patient placed on isolation precautions until infectious etiology ruled out  - Patient has thrombocytosis 598, concerning for malignancy  - Will consider consulting ID in the future  - Patient currently comfortable on O2 without desaturation or reported SOB  - Reports breathing is much improved today  -HIV neg  - Pulmonary consulted; appreciate rec's    Hypertension  - Patient hypertensive in ED to , decreased to 174 with metoprolol  - PRN hydralazine for SBP>180  - Amlodipine      Cachexia  -Resume normal diet  -Nutrition consulted for optimization of diet  -Concerning in setting of lung mass for malignancy vs infectious process  -Pulm consulted        VTE Risk Mitigation (From admission, onward)         Ordered     enoxaparin injection 40 mg  Daily       02/10/20 2131     IP VTE HIGH RISK PATIENT  Once      02/10/20 2131     Place sequential compression device  Until discontinued      02/10/20 2131                      Shawn Valverde MD  Department of Hospital Medicine   [facility]

## 2020-02-12 NOTE — PT/OT/SLP PROGRESS
"Physical Therapy Treatment Discharge    Patient Name:  Chuy Stevens   MRN:  [id number]    Recommendations:     Discharge Recommendations:  home, other (see comments)(patient has no medical insurance)   Discharge Equipment Recommendations: shower chair   Barriers to discharge: None    Assessment:     Chuy Stevens is a 64 y.o. male admitted with a medical diagnosis of Pulmonary nodules.  He presents with the following impairments/functional limitations:  weakness, impaired cardiopulmonary response to activity, impaired endurance . Patient independent with gait and all transitional movements. No acute skilled PT needs. Will DC PT service. Recommend Home also pulmonary rehab if patient becomes insured.     Rehab Prognosis: Good; patient would benefit from acute skilled PT services to address these deficits and reach maximum level of function.    Recent Surgery: * No surgery found *      Plan:     During this hospitalization, patient to be seen 5 x/week to address the identified rehab impairments via gait training, therapeutic activities, therapeutic exercises and progress toward the following goals:    · Plan of Care Expires:  03/11/20    Subjective     Chief Complaint: none voice  Patient/Family Comments/goals: " I feel great this morning"  Pain/Comfort:  · Pain Rating 1: 0/10  · Pain Rating Post-Intervention 1: 0/10      Objective:     Communicated with primary nurse prior to session.  Patient found HOB elevated with telemetry, pulse ox (continuous), oxygen, blood pressure cuff upon PT entry to room.     General Precautions: Standard, droplet   Orthopedic Precautions:N/A   Braces: N/A     Functional Mobility:  · Bed Mobility:     · Supine to Sit: independence  · Sit to Supine: independence  · Transfers:     · Sit to Stand:  independence with no AD  · Gait: 200 ft independent  · Balance: Good      AM-PAC 6 CLICK MOBILITY  Turning over in bed (including adjusting bedclothes, sheets and blankets)?: 4  Sitting down " on and standing up from a chair with arms (e.g., wheelchair, bedside commode, etc.): 4  Moving from lying on back to sitting on the side of the bed?: 4  Moving to and from a bed to a chair (including a wheelchair)?: 4  Need to walk in hospital room?: 4  Climbing 3-5 steps with a railing?: 4  Basic Mobility Total Score: 24       Therapeutic Activities and Exercises:   na    Patient left HOB elevated with call button in reach..    GOALS:   Multidisciplinary Problems     Physical Therapy Goals     Not on file          Multidisciplinary Problems (Resolved)        Problem: Physical Therapy Goal    Goal Priority Disciplines Outcome Goal Variances Interventions   Physical Therapy Goal   (Resolved)     PT, PT/OT Met     Description:  Goals to be met by: 3/11/2020     Patient will increase functional independence with mobility by performin. Supine to sit with Modified [facility]  2. Sit to stand transfer with Modified [facility]  3. Gait  x >200 feet with [facility] using most appropriate AD or no AD.                       Time Tracking:     PT Received On: 20  PT Start Time: 958     PT Stop Time: 1013  PT Total Time (min): 15 min     Billable Minutes: Gait Training 12    Treatment Type: Treatment  PT/PTA: PT           Patel Sue, PT  2020

## 2020-02-12 NOTE — PLAN OF CARE
TN met with pt - lives alone - pt's landlord will pick him up at d/c    independent prior to admit - works as a  --- no dme, no hh   has not seen a doctor in many years.      Ashli thorpe has not yet interviewed pt - TN will send Ms. Skaggs a note asking that she see pt    f/u apts made for pt      Marlen Preciado RN   Transitional Navigator   Care Management   Progress Notes   Signed                       []Hide copied text    []Hover for details          Follow-Up           Follow-up With   Details   Why   Contact Info       On 2/17/2020   [facility] 2:00 pm [address] [place] [address] p. [phone] f. [phone] please bring your ID and two paycheck stubs or proof of income . You will be charged according to your ability to pay.        [facility] - Scheduling       the Pulmonology Dept will call you with an apt ; fax # [phone]    [address] [place] [address] [id number] [phone]                       02/12/20 1128   Discharge Assessment   Assessment Type Discharge Planning Assessment   Confirmed/corrected address and phone number on facesheet? Yes   Assessment information obtained from? Patient   Expected Length of Stay (days) 1   Communicated expected length of stay with patient/caregiver yes   Prior to hospitilization cognitive status: Alert/Oriented   Prior to hospitalization functional status: Independent   Current cognitive status: Alert/Oriented   Current Functional Status: Independent   Able to Return to Prior Arrangements yes   Is patient able to care for self after discharge? Yes   Who are your caregiver(s) and their phone number(s)?   (brother - Simon -   [phone] - lives in [place]  )   Patient's perception of discharge disposition home or selfcare   Readmission Within the Last 30 Days no previous admission in last 30 days   Patient currently being followed by outpatient case management? No   Patient currently receives any other  outside agency services? No   Equipment Currently Used at Home none   Do you have any problems affording any of your prescribed medications?   (is not on medications )   Does the patient have transportation home? Yes  (pt's Landlord will pick him up )   Transportation Anticipated family or friend will provide   Does the patient receive services at the [facility]? No   Discharge Plan A Home   Discharge Plan B Home   DME Needed Upon Discharge  none   Patient/Family in Agreement with Plan yes

## 2020-02-12 NOTE — NURSING
Permission attained to enter room via virtual system.  Virtual rounds completed as documented.  Today's lab values, notes, and vital signs up to now have been reviewed. Patient is ready for discharge

## 2020-02-12 NOTE — PLAN OF CARE
Plan of Care communicated with patient.  Patient AAOx4, able to make needs known.  Able to use bathroom, urinal at bedside.  Patient on airborne precaution.  IV patent, intact.  On telemetry.  Cooperative with health care plan.

## 2020-02-13 ENCOUNTER — PATIENT OUTREACH (OUTPATIENT)
Dept: ADMINISTRATIVE | Facility: CLINIC | Age: 65
End: 2020-02-13

## 2020-02-13 LAB
HISTOPLASMA AG VALUE: 0 NG/ML
HISTOPLASMA ANTIGEN URINE: NEGATIVE

## 2020-02-13 NOTE — TELEPHONE ENCOUNTER
C3 nurse attempted to contact patient. No answer. The following message was left for the patient to return the call:  Good morning  I am a nurse calling on behalf of [facility] from the [facility].  This is a Transitional Care Call for Chuy Stevens. When you have a moment please contact us at [phone] or [phone] Monday through Friday, between the hours of 8 am to 4 pm. We look forward to speaking with you. On behalf of [facility] have a nice day.    The patient has a scheduled HOSFU appointment with [facility]  ON  2/17/2020 AT   2:00 pm

## 2020-02-14 NOTE — PATIENT INSTRUCTIONS
Treating Pneumonia  Pneumonia is an infection of one or both of the lungs. Pneumonia:  · Is usually caused by either a virus or a bacteria  · Can be very serious, especially in infants, young children, and older adults. Its also serious for those with other long-term health problems or weakened immune systems.  · Is sometimes treated at home and sometimes in the hospital    Antibiotic medicines  Antibiotics may be prescribed for pneumonia caused by bacteria. They may be pills (oral medicines), or shots (injections). Or they may be given by IV (intravenously) into a vein. If you are taking oral medicines at home:  · Fill your prescription and start taking your medicine as soon as you can.  · You will likely start to feel better in a day or 2, but dont stop taking the antibiotic.  · Use a pill organizer to help you remember to take your medicine.  · Let your healthcare provider know if you have side effects.  · Take your medicine exactly as directed on the label. Talk to your provider or pharmacist if you have any questions.  Antiviral medicines  Antiviral medicine may be prescribed for pneumonia caused by a virus. For example, antiviral medicine may be prescribed for pneumonia caused by the flu virus. Antibiotics do not work against viruses. If you are taking antiviral medicine at home:  · Fill your prescription and start taking your medicine as soon as you can.  · Talk with your provider or pharmacist about possible side effects.  · Take the medicine exactly as instructed.  To relieve symptoms  There are many medicines that can help relieve symptoms of pneumonia. Some are prescription and some are over-the-counter.  Your healthcare provider may recommend:  · Acetaminophen or ibuprofen to lower your fever and to lessen headache or other pain  · Cough medicine to loosen mucus or to reduce coughing  Make sure you check with your healthcare provider or pharmacist before taking any over-the-counter  medicines.  Special treatments  If you are hospitalized for pneumonia, you may have other therapies, including:  · Inhaled medicines to help with breathing or chest congestion  · Supplemental oxygen to increase low oxygen levels  Drink fluids and eat healthy  You should eat healthy to help your body fight the infection. Drinking a lot of fluids helps to replace fluids lost from fever and to loosen mucus in your chest.  · Diet. Make healthy food choices, including fruits and vegetables, lean meats and other proteins, 100% whole grain and low- or no-fat dairy products.  · Fluids. Drink at least 6 to 8 tall glasses a day. Water and 100% fruit or vegetable juice are best.  Get plenty of rest and sleep  You may be more tired than usual for a while. It is important to get enough sleep at night. Its also important to rest during the day. Talk with your healthcare provider if coughing or other symptoms are interfering with your sleep.  Preventing the spread of germs  The best thing you can do to prevent spreading germs is to wash your hands often. You should:  · Rub your hand with soap and water for 20 to 30 seconds.  · Clean in between your fingers, the backs of your hands, and around your nails.  · Dry your hands on a separate towel or use paper towels.  You should also:  · Keep alcohol-based hand  nearby.  · Make sure you also clean surfaces that you touch. Use a product that kills all types of germs.  · Stay away from others until you are feeling better.  When to call your healthcare provider  Call your healthcare provider if you have any of the following:  · Symptoms get worse  · Fever continues  · Shortness of breath gets worse  · Increased mucus or mucus that is darker in color  · Coughing gets worse  · Lips or fingers are bluish in color  · Side effects from your medicine   Date Last Reviewed: 12/1/2016  © 5904-2795 [employer]. [address] [place] [address], [place] [address]. All rights reserved.  This information is not intended as a substitute for professional medical care. Always follow your healthcare professional's instructions.

## 2021-06-26 NOTE — PROGRESS NOTES
[facility] Medicine  Progress Note    Patient Name: Chuy Stevens  MRN: [id number]  Patient Class: IP- Inpatient   Admission Date: 2/10/2020  Length of Stay: 0 days  Attending Physician: Andrés Gee MD  Primary Care Provider: Primary Doctor No        Subjective:     Principal Problem:Pulmonary nodules        HPI:  Mr. Chuy Moran is a 63 yo man with no known PMH presenting to hospital for dyspnea on exertion over past 4 weeks. Patient BP was 244/136 at presentation, decreaed to 174/110 with metoprolol. Patient displayed some SOB. Patient DDimer was elevated, CTA negative for PE, but positive for nodules of unknown significance. Patient placed in isolation as TB precaution. Per patient, he smoked cigarettes from age 10 to age 27, and has since only smoked marijuana. Patient has used cocaine in the past, but denies IVDU. Patient's father  of liver cancer, and his brother was recently diagnosed. Patient is a  by trade, and has been active and exercising until for weeks ago when he had flu-like symptoms, cough productive of whitish sputum, and lost 25 lbs rapidly. His appetite has been decreased. Patient denies N/V, endorses chills and subjective fever. Patient admitted to hospital for further evaluation.     Overview/Hospital Course:  No notes on file    Interval History: Patient seen and examined this morning. No acute events overnight. Patient continues to report some shortness of breath but it is adequately controlled on O2. No other complaints at this time.     Review of Systems   Constitutional: Positive for chills, fatigue and unexpected weight change. Negative for appetite change and fever.   HENT: Positive for dental problem. Negative for rhinorrhea, sinus pressure, sinus pain, sneezing and sore throat.    Respiratory: Positive for cough, chest tightness and shortness of breath. Negative for wheezing.    Cardiovascular: Negative for chest pain and  This is 75years old female admitted with upper GI bleed. Pt resting in bed, watching TV with . A&O X4. Denies any pain, numbness and tingling. HR regular. Patient denies any palpitation, chest pain, or chest tightness. Lungs sounds clear, diminished to base. Abd soft and nontender. Active bowel sounds. Had BM today. Continent of bowel & bladder. Depends on. Denies any abd discomfort. Skin dry and intact. Midline to right arm. BLE swollen. Transfers SBA with gait belt, huband and daughter okay to transfer patient. HS medication given. Tolerated well. Education provided on pain management, skin care, fall precaution, and medication. Call light in reach. Patient instructed to call if there is any needs or changes.  Electronically signed by Brian Paniagua RN on 6/25/2021 at 8:32 PM palpitations.   Gastrointestinal: Negative for abdominal distention, abdominal pain, blood in stool, constipation, diarrhea, nausea and vomiting.   Genitourinary: Negative for dysuria, flank pain, frequency, hematuria and urgency.   Musculoskeletal: Negative for arthralgias and back pain.   Skin: Negative for color change and rash.   Neurological: Positive for dizziness, weakness and light-headedness. Negative for numbness and headaches.   Psychiatric/Behavioral: Negative for behavioral problems, confusion, decreased concentration and sleep disturbance. The patient is not nervous/anxious.      Objective:     Vital Signs (Most Recent):  Temp: 97.9 °F (36.6 °C) (02/10/20 1450)  Pulse: (!) 112 (02/11/20 1232)  Resp: (!) 28 (02/11/20 1232)  BP: (!) 152/97 (02/11/20 1232)  SpO2: (!) 93 % (02/11/20 1232) Vital Signs (24h Range):  Temp:  [97.9 °F (36.6 °C)] 97.9 °F (36.6 °C)  Pulse:  [] 112  Resp:  [18-40] 28  SpO2:  [91 %-100 %] 93 %  BP: (143-224)/() 152/97     Weight: 57.6 kg (127 lb)  Body mass index is 17.22 kg/m².    Intake/Output Summary (Last 24 hours) at 2/11/2020 1302  Last data filed at 2/11/2020 1252  Gross per 24 hour   Intake 350 ml   Output 950 ml   Net -600 ml      Physical Exam   Constitutional: He is oriented to person, place, and time. No distress.   Cachectic   HENT:   Head: Normocephalic.   Right Ear: External ear normal.   Left Ear: External ear normal.   Mouth/Throat: Abnormal dentition. Dental caries present.   Eyes: Pupils are equal, round, and reactive to light. Conjunctivae and EOM are normal.   Neck: Normal range of motion. Neck supple. No JVD present.   Cardiovascular: Normal rate, regular rhythm, normal heart sounds and intact distal pulses.   No murmur heard.  Pulmonary/Chest: Effort normal. No respiratory distress. He has no wheezes.   Poor air entry bilaterally   Abdominal: Soft. Bowel sounds are normal. He exhibits no distension and no mass. There is no tenderness.  "  Musculoskeletal: Normal range of motion. He exhibits no edema.   Neurological: He is alert and oriented to person, place, and time.   Skin: Skin is warm and dry. Capillary refill takes less than 2 seconds. He is not diaphoretic. There is pallor.   Psychiatric: He has a normal mood and affect. His behavior is normal.       Significant Labs:   CBC:   Recent Labs   Lab 02/10/20  1515 02/11/20  0802   WBC 10.69 10.15   HGB 14.2 13.5*   HCT 42.5 39.9*   * 532*     CMP:   Recent Labs   Lab 02/10/20  1515 02/11/20  0803    139   K 3.6 4.1    104   CO2 26 26   * 97   BUN 14 14   CREATININE 0.9 0.8   CALCIUM 9.7 9.5   PROT 8.0 7.4   ALBUMIN 3.2* 3.0*   BILITOT 0.3 0.5   ALKPHOS 104 94   AST 34 33   ALT 54* 50*   ANIONGAP 10 9   EGFRNONAA >60 >60       Significant Imaging:   CTA Chest: "The airways are patent, noting there is mild central peribronchial thickening.  There is bilateral emphysematous change.  There is atelectasis along the lateral aspect of the fissure on the right.  There is anterior right middle lobe atelectasis.  There is right basilar dependent atelectasis.  There are a few tree-in-bud type nodules within the periphery of the right lower lobe measuring up to 3-4 mm.  There is a focus of consolidation along the periphery of the right lower lobe with surrounding tree-in-bud type nodularity.  There is an additional focus of consolidative opacity with possible central lucency measuring 1.2 cm.  A similar although smaller focus is noted more cranially within the right lower lobe measuring 6-7 mm.  There is right apical atelectasis or scarring.  There are scattered left upper lobe pleural based pulmonary nodules measuring 3-4 mm.  There is consolidative opacity with central lucency along the fissure on the left within the left lower lobe, measuring 1.9 cm.  There is a calcified granuloma within the left lower lobe.  There are scattered foci of tree-in-bud nodularity along the periphery " "of the left lower lobe.  No pneumothorax.  No pleural effusion."      Assessment/Plan:      * Pulmonary nodules  - Concerning for malignancy versus infectious process  - Testing for Hep C, HIV, TB in process  - Pulmonary consulted, recs appreciated  - Patient placed on isolation precautions until infectious etiology ruled out  - Patient has thrombocytosis 598, concerning for malignancy  - Will consider consulting ID in the future  - Patient currently comfortable on O2 without desaturation or reported SOB    Hypertension  - Patient hypertensive in ED to , decreased to 174 with metoprolol  - PRN hydralazine for SBP>180  - Started Amlodipine      Cachexia  -Resume normal diet  -Nutrition consulted for optimization of diet  -Concerning in setting of lung mass for malignancy vs infectious process  -Pulm consulted        VTE Risk Mitigation (From admission, onward)         Ordered     enoxaparin injection 40 mg  Daily      02/10/20 2131     IP VTE HIGH RISK PATIENT  Once      02/10/20 2131     Place sequential compression device  Until discontinued      02/10/20 2131                      Shawn Valverde MD PGY-I  Department of Hospital Medicine   [facility]    "

## 2021-10-19 ENCOUNTER — HOSPITAL ENCOUNTER (EMERGENCY)
Facility: HOSPITAL | Age: 66
Discharge: HOME | End: 2021-10-19
Attending: EMERGENCY MEDICINE
Payer: MEDICARE

## 2021-10-19 VITALS
HEART RATE: 67 BPM | TEMPERATURE: 99 F | RESPIRATION RATE: 18 BRPM | SYSTOLIC BLOOD PRESSURE: 171 MMHG | DIASTOLIC BLOOD PRESSURE: 89 MMHG | WEIGHT: 117 LBS | HEIGHT: 72 IN | OXYGEN SATURATION: 97 % | BODY MASS INDEX: 15.85 KG/M2

## 2021-10-19 DIAGNOSIS — J44.1 COPD EXACERBATION: Primary | ICD-10-CM

## 2021-10-19 DIAGNOSIS — R06.02 SOB (SHORTNESS OF BREATH): ICD-10-CM

## 2021-10-19 LAB
ALBUMIN SERPL BCP-MCNC: 3.8 G/DL (ref 3.5–5.2)
ALP SERPL-CCNC: 78 U/L (ref 55–135)
ALT SERPL W/O P-5'-P-CCNC: 12 U/L (ref 10–44)
ANION GAP SERPL CALC-SCNC: 11 MMOL/L (ref 8–16)
AST SERPL-CCNC: 15 U/L (ref 10–40)
BASOPHILS # BLD AUTO: 0.06 K/UL (ref 0–0.2)
BASOPHILS NFR BLD: 0.8 % (ref 0–1.9)
BILIRUB SERPL-MCNC: 0.3 MG/DL (ref 0.1–1)
BUN SERPL-MCNC: 14 MG/DL (ref 8–23)
CALCIUM SERPL-MCNC: 9.8 MG/DL (ref 8.7–10.5)
CHLORIDE SERPL-SCNC: 104 MMOL/L (ref 95–110)
CO2 SERPL-SCNC: 24 MMOL/L (ref 23–29)
CREAT SERPL-MCNC: 0.9 MG/DL (ref 0.5–1.4)
CTP QC/QA: YES
D DIMER PPP IA.FEU-MCNC: 0.68 MG/L FEU
DIFFERENTIAL METHOD: NORMAL
EOSINOPHIL # BLD AUTO: 0.3 K/UL (ref 0–0.5)
EOSINOPHIL NFR BLD: 3.4 % (ref 0–8)
ERYTHROCYTE [DISTWIDTH] IN BLOOD BY AUTOMATED COUNT: 12.9 % (ref 11.5–14.5)
EST. GFR  (AFRICAN AMERICAN): >60 ML/MIN/1.73 M^2
EST. GFR  (NON AFRICAN AMERICAN): >60 ML/MIN/1.73 M^2
GLUCOSE SERPL-MCNC: 83 MG/DL (ref 70–110)
HCT VFR BLD AUTO: 42.1 % (ref 40–54)
HGB BLD-MCNC: 14.4 G/DL (ref 14–18)
IMM GRANULOCYTES # BLD AUTO: 0.02 K/UL (ref 0–0.04)
IMM GRANULOCYTES NFR BLD AUTO: 0.3 % (ref 0–0.5)
LYMPHOCYTES # BLD AUTO: 2 K/UL (ref 1–4.8)
LYMPHOCYTES NFR BLD: 25 % (ref 18–48)
MAGNESIUM SERPL-MCNC: 2.1 MG/DL (ref 1.6–2.6)
MCH RBC QN AUTO: 31 PG (ref 27–31)
MCHC RBC AUTO-ENTMCNC: 34.2 G/DL (ref 32–36)
MCV RBC AUTO: 91 FL (ref 82–98)
MONOCYTES # BLD AUTO: 0.6 K/UL (ref 0.3–1)
MONOCYTES NFR BLD: 7.5 % (ref 4–15)
NEUTROPHILS # BLD AUTO: 5 K/UL (ref 1.8–7.7)
NEUTROPHILS NFR BLD: 63 % (ref 38–73)
NRBC BLD-RTO: 0 /100 WBC
PLATELET # BLD AUTO: 265 K/UL (ref 150–450)
PMV BLD AUTO: 10.5 FL (ref 9.2–12.9)
POTASSIUM SERPL-SCNC: 3.7 MMOL/L (ref 3.5–5.1)
PROT SERPL-MCNC: 7.8 G/DL (ref 6–8.4)
RBC # BLD AUTO: 4.65 M/UL (ref 4.6–6.2)
SARS-COV-2 RDRP RESP QL NAA+PROBE: NEGATIVE
SODIUM SERPL-SCNC: 139 MMOL/L (ref 136–145)
TROPONIN I SERPL DL<=0.01 NG/ML-MCNC: 0.01 NG/ML (ref 0–0.03)
WBC # BLD AUTO: 7.96 K/UL (ref 3.9–12.7)

## 2021-10-19 PROCEDURE — 94640 AIRWAY INHALATION TREATMENT: CPT

## 2021-10-19 PROCEDURE — 63600175 PHARM REV CODE 636 W HCPCS: Performed by: EMERGENCY MEDICINE

## 2021-10-19 PROCEDURE — 80053 COMPREHEN METABOLIC PANEL: CPT | Performed by: EMERGENCY MEDICINE

## 2021-10-19 PROCEDURE — 25000242 PHARM REV CODE 250 ALT 637 W/ HCPCS: Performed by: EMERGENCY MEDICINE

## 2021-10-19 PROCEDURE — 85025 COMPLETE CBC W/AUTO DIFF WBC: CPT | Performed by: EMERGENCY MEDICINE

## 2021-10-19 PROCEDURE — 93005 ELECTROCARDIOGRAM TRACING: CPT

## 2021-10-19 PROCEDURE — 93010 EKG 12-LEAD: ICD-10-PCS | Mod: ,,, | Performed by: INTERNAL MEDICINE

## 2021-10-19 PROCEDURE — 84484 ASSAY OF TROPONIN QUANT: CPT | Performed by: EMERGENCY MEDICINE

## 2021-10-19 PROCEDURE — 83735 ASSAY OF MAGNESIUM: CPT | Performed by: EMERGENCY MEDICINE

## 2021-10-19 PROCEDURE — 96374 THER/PROPH/DIAG INJ IV PUSH: CPT

## 2021-10-19 PROCEDURE — U0002 COVID-19 LAB TEST NON-CDC: HCPCS | Performed by: PHYSICIAN ASSISTANT

## 2021-10-19 PROCEDURE — 85379 FIBRIN DEGRADATION QUANT: CPT | Performed by: EMERGENCY MEDICINE

## 2021-10-19 PROCEDURE — 99285 EMERGENCY DEPT VISIT HI MDM: CPT | Mod: 25

## 2021-10-19 PROCEDURE — 93010 ELECTROCARDIOGRAM REPORT: CPT | Mod: ,,, | Performed by: INTERNAL MEDICINE

## 2021-10-19 RX ORDER — ALBUTEROL SULFATE 90 UG/1
1-2 AEROSOL, METERED RESPIRATORY (INHALATION) EVERY 6 HOURS PRN
Qty: 8.5 G | Refills: 1 | Status: SHIPPED | OUTPATIENT
Start: 2021-10-19

## 2021-10-19 RX ORDER — METHYLPREDNISOLONE SOD SUCC 125 MG
80 VIAL (EA) INJECTION
Status: COMPLETED | OUTPATIENT
Start: 2021-10-19 | End: 2021-10-19

## 2021-10-19 RX ORDER — PREDNISONE 20 MG/1
40 TABLET ORAL DAILY
Qty: 10 TABLET | Refills: 0 | Status: SHIPPED | OUTPATIENT
Start: 2021-10-19 | End: 2021-11-14

## 2021-10-19 RX ORDER — ALBUTEROL SULFATE 90 UG/1
1-2 AEROSOL, METERED RESPIRATORY (INHALATION) EVERY 6 HOURS PRN
Qty: 6.7 G | Refills: 1 | Status: SHIPPED | OUTPATIENT
Start: 2021-10-19 | End: 2021-10-19 | Stop reason: SDUPTHER

## 2021-10-19 RX ORDER — IPRATROPIUM BROMIDE AND ALBUTEROL SULFATE 2.5; .5 MG/3ML; MG/3ML
3 SOLUTION RESPIRATORY (INHALATION)
Status: COMPLETED | OUTPATIENT
Start: 2021-10-19 | End: 2021-10-19

## 2021-10-19 RX ADMIN — IPRATROPIUM BROMIDE AND ALBUTEROL SULFATE 3 ML: .5; 3 SOLUTION RESPIRATORY (INHALATION) at 05:10

## 2021-10-19 RX ADMIN — METHYLPREDNISOLONE SODIUM SUCCINATE 80 MG: 125 INJECTION, POWDER, FOR SOLUTION INTRAMUSCULAR; INTRAVENOUS at 03:10

## 2021-11-11 ENCOUNTER — OFFICE VISIT (OUTPATIENT)
Dept: FAMILY MEDICINE | Facility: CLINIC | Age: 66
End: 2021-11-11
Payer: MEDICARE

## 2021-11-11 ENCOUNTER — HOSPITAL ENCOUNTER (EMERGENCY)
Facility: HOSPITAL | Age: 66
Discharge: HOME | End: 2021-11-11
Attending: EMERGENCY MEDICINE
Payer: MEDICARE

## 2021-11-11 VITALS
HEIGHT: 71 IN | SYSTOLIC BLOOD PRESSURE: 158 MMHG | WEIGHT: 134 LBS | BODY MASS INDEX: 18.76 KG/M2 | DIASTOLIC BLOOD PRESSURE: 90 MMHG | TEMPERATURE: 98 F | RESPIRATION RATE: 18 BRPM | OXYGEN SATURATION: 98 % | HEART RATE: 76 BPM

## 2021-11-11 VITALS
SYSTOLIC BLOOD PRESSURE: 198 MMHG | HEART RATE: 86 BPM | BODY MASS INDEX: 18.36 KG/M2 | OXYGEN SATURATION: 97 % | HEIGHT: 72 IN | WEIGHT: 135.56 LBS | DIASTOLIC BLOOD PRESSURE: 100 MMHG

## 2021-11-11 DIAGNOSIS — J44.1 COPD EXACERBATION: Primary | ICD-10-CM

## 2021-11-11 DIAGNOSIS — I10 HYPERTENSION, UNSPECIFIED TYPE: Primary | ICD-10-CM

## 2021-11-11 DIAGNOSIS — R06.02 SOB (SHORTNESS OF BREATH): ICD-10-CM

## 2021-11-11 DIAGNOSIS — J06.9 UPPER RESPIRATORY TRACT INFECTION, UNSPECIFIED TYPE: ICD-10-CM

## 2021-11-11 DIAGNOSIS — J44.1 COPD EXACERBATION: ICD-10-CM

## 2021-11-11 DIAGNOSIS — R03.0 ELEVATED BLOOD PRESSURE READING: ICD-10-CM

## 2021-11-11 LAB
ALBUMIN SERPL BCP-MCNC: 4 G/DL (ref 3.5–5.2)
ALP SERPL-CCNC: 73 U/L (ref 55–135)
ALT SERPL W/O P-5'-P-CCNC: 17 U/L (ref 10–44)
ANION GAP SERPL CALC-SCNC: 13 MMOL/L (ref 8–16)
AST SERPL-CCNC: 22 U/L (ref 10–40)
BASOPHILS # BLD AUTO: 0.05 K/UL (ref 0–0.2)
BASOPHILS NFR BLD: 0.6 % (ref 0–1.9)
BILIRUB SERPL-MCNC: 0.3 MG/DL (ref 0.1–1)
BNP SERPL-MCNC: 53 PG/ML (ref 0–99)
BUN SERPL-MCNC: 23 MG/DL (ref 8–23)
CALCIUM SERPL-MCNC: 10.2 MG/DL (ref 8.7–10.5)
CHLORIDE SERPL-SCNC: 104 MMOL/L (ref 95–110)
CO2 SERPL-SCNC: 23 MMOL/L (ref 23–29)
CREAT SERPL-MCNC: 0.9 MG/DL (ref 0.5–1.4)
DIFFERENTIAL METHOD: ABNORMAL
EOSINOPHIL # BLD AUTO: 0.3 K/UL (ref 0–0.5)
EOSINOPHIL NFR BLD: 3.8 % (ref 0–8)
ERYTHROCYTE [DISTWIDTH] IN BLOOD BY AUTOMATED COUNT: 13.2 % (ref 11.5–14.5)
EST. GFR  (AFRICAN AMERICAN): >60 ML/MIN/1.73 M^2
EST. GFR  (NON AFRICAN AMERICAN): >60 ML/MIN/1.73 M^2
GLUCOSE SERPL-MCNC: 82 MG/DL (ref 70–110)
HCT VFR BLD AUTO: 45.5 % (ref 40–54)
HGB BLD-MCNC: 15.5 G/DL (ref 14–18)
IMM GRANULOCYTES # BLD AUTO: 0.03 K/UL (ref 0–0.04)
IMM GRANULOCYTES NFR BLD AUTO: 0.3 % (ref 0–0.5)
LYMPHOCYTES # BLD AUTO: 1.6 K/UL (ref 1–4.8)
LYMPHOCYTES NFR BLD: 18.5 % (ref 18–48)
MCH RBC QN AUTO: 31.3 PG (ref 27–31)
MCHC RBC AUTO-ENTMCNC: 34.1 G/DL (ref 32–36)
MCV RBC AUTO: 92 FL (ref 82–98)
MONOCYTES # BLD AUTO: 0.8 K/UL (ref 0.3–1)
MONOCYTES NFR BLD: 8.8 % (ref 4–15)
NEUTROPHILS # BLD AUTO: 6 K/UL (ref 1.8–7.7)
NEUTROPHILS NFR BLD: 68 % (ref 38–73)
NRBC BLD-RTO: 0 /100 WBC
PLATELET # BLD AUTO: 232 K/UL (ref 150–450)
PMV BLD AUTO: 10.6 FL (ref 9.2–12.9)
POTASSIUM SERPL-SCNC: 3.8 MMOL/L (ref 3.5–5.1)
PROT SERPL-MCNC: 8.2 G/DL (ref 6–8.4)
RBC # BLD AUTO: 4.96 M/UL (ref 4.6–6.2)
SARS-COV-2 RDRP RESP QL NAA+PROBE: NEGATIVE
SODIUM SERPL-SCNC: 140 MMOL/L (ref 136–145)
TROPONIN I SERPL DL<=0.01 NG/ML-MCNC: <0.006 NG/ML (ref 0–0.03)
WBC # BLD AUTO: 8.79 K/UL (ref 3.9–12.7)

## 2021-11-11 PROCEDURE — 85025 COMPLETE CBC W/AUTO DIFF WBC: CPT | Performed by: PHYSICIAN ASSISTANT

## 2021-11-11 PROCEDURE — 99204 PR OFFICE/OUTPT VISIT, NEW, LEVL IV, 45-59 MIN: ICD-10-PCS | Mod: S$PBB,,, | Performed by: FAMILY MEDICINE

## 2021-11-11 PROCEDURE — 93010 ELECTROCARDIOGRAM REPORT: CPT | Mod: ,,, | Performed by: INTERNAL MEDICINE

## 2021-11-11 PROCEDURE — 99204 OFFICE O/P NEW MOD 45 MIN: CPT | Mod: S$PBB,,, | Performed by: FAMILY MEDICINE

## 2021-11-11 PROCEDURE — 93005 ELECTROCARDIOGRAM TRACING: CPT

## 2021-11-11 PROCEDURE — 25000003 PHARM REV CODE 250: Performed by: EMERGENCY MEDICINE

## 2021-11-11 PROCEDURE — 80053 COMPREHEN METABOLIC PANEL: CPT | Performed by: PHYSICIAN ASSISTANT

## 2021-11-11 PROCEDURE — 99999 PR PBB SHADOW E&M-EST. PATIENT-LVL III: CPT | Mod: PBBFAC,,, | Performed by: FAMILY MEDICINE

## 2021-11-11 PROCEDURE — 99999 PR PBB SHADOW E&M-EST. PATIENT-LVL III: ICD-10-PCS | Mod: PBBFAC,,, | Performed by: FAMILY MEDICINE

## 2021-11-11 PROCEDURE — 99285 EMERGENCY DEPT VISIT HI MDM: CPT | Mod: 25,27

## 2021-11-11 PROCEDURE — 93010 EKG 12-LEAD: ICD-10-PCS | Mod: ,,, | Performed by: INTERNAL MEDICINE

## 2021-11-11 PROCEDURE — U0002 COVID-19 LAB TEST NON-CDC: HCPCS | Performed by: PHYSICIAN ASSISTANT

## 2021-11-11 PROCEDURE — 83880 ASSAY OF NATRIURETIC PEPTIDE: CPT | Performed by: PHYSICIAN ASSISTANT

## 2021-11-11 PROCEDURE — 94640 AIRWAY INHALATION TREATMENT: CPT

## 2021-11-11 PROCEDURE — 25000242 PHARM REV CODE 250 ALT 637 W/ HCPCS: Performed by: EMERGENCY MEDICINE

## 2021-11-11 PROCEDURE — 99213 OFFICE O/P EST LOW 20 MIN: CPT | Mod: PBBFAC,PO | Performed by: FAMILY MEDICINE

## 2021-11-11 PROCEDURE — 84484 ASSAY OF TROPONIN QUANT: CPT | Performed by: PHYSICIAN ASSISTANT

## 2021-11-11 RX ORDER — CAPTOPRIL 12.5 MG/1
25 TABLET ORAL
Status: DISCONTINUED | OUTPATIENT
Start: 2021-11-11 | End: 2021-11-11

## 2021-11-11 RX ORDER — AMLODIPINE BESYLATE 5 MG/1
10 TABLET ORAL
Status: COMPLETED | OUTPATIENT
Start: 2021-11-11 | End: 2021-11-11

## 2021-11-11 RX ORDER — AMLODIPINE BESYLATE 10 MG/1
10 TABLET ORAL DAILY
Qty: 90 TABLET | Refills: 3 | Status: SHIPPED | OUTPATIENT
Start: 2021-11-11 | End: 2021-11-17 | Stop reason: SDUPTHER

## 2021-11-11 RX ORDER — IPRATROPIUM BROMIDE AND ALBUTEROL SULFATE 2.5; .5 MG/3ML; MG/3ML
3 SOLUTION RESPIRATORY (INHALATION)
Status: COMPLETED | OUTPATIENT
Start: 2021-11-11 | End: 2021-11-11

## 2021-11-11 RX ADMIN — IPRATROPIUM BROMIDE AND ALBUTEROL SULFATE 3 ML: .5; 3 SOLUTION RESPIRATORY (INHALATION) at 02:11

## 2021-11-11 RX ADMIN — AMLODIPINE BESYLATE 10 MG: 5 TABLET ORAL at 01:11

## 2021-11-17 ENCOUNTER — OFFICE VISIT (OUTPATIENT)
Dept: FAMILY MEDICINE | Facility: CLINIC | Age: 66
End: 2021-11-17
Payer: MEDICARE

## 2021-11-17 VITALS
WEIGHT: 132.5 LBS | OXYGEN SATURATION: 98 % | BODY MASS INDEX: 18.55 KG/M2 | HEIGHT: 71 IN | DIASTOLIC BLOOD PRESSURE: 88 MMHG | HEART RATE: 120 BPM | SYSTOLIC BLOOD PRESSURE: 174 MMHG

## 2021-11-17 DIAGNOSIS — Z12.11 SCREENING FOR COLON CANCER: ICD-10-CM

## 2021-11-17 DIAGNOSIS — J44.9 CHRONIC OBSTRUCTIVE PULMONARY DISEASE, UNSPECIFIED COPD TYPE: ICD-10-CM

## 2021-11-17 DIAGNOSIS — I10 HYPERTENSION, UNSPECIFIED TYPE: ICD-10-CM

## 2021-11-17 DIAGNOSIS — Z23 ENCOUNTER FOR IMMUNIZATION: Primary | ICD-10-CM

## 2021-11-17 PROBLEM — J18.9 PNEUMONIA: Status: RESOLVED | Noted: 2020-02-11 | Resolved: 2021-11-17

## 2021-11-17 PROCEDURE — G0008 ADMIN INFLUENZA VIRUS VAC: HCPCS | Mod: PBBFAC,PO

## 2021-11-17 PROCEDURE — 99213 OFFICE O/P EST LOW 20 MIN: CPT | Mod: PBBFAC,PO | Performed by: FAMILY MEDICINE

## 2021-11-17 PROCEDURE — 90694 VACC AIIV4 NO PRSRV 0.5ML IM: CPT | Mod: PBBFAC,PO

## 2021-11-17 PROCEDURE — 99214 PR OFFICE/OUTPT VISIT, EST, LEVL IV, 30-39 MIN: ICD-10-PCS | Mod: S$PBB,,, | Performed by: FAMILY MEDICINE

## 2021-11-17 PROCEDURE — 99214 OFFICE O/P EST MOD 30 MIN: CPT | Mod: S$PBB,,, | Performed by: FAMILY MEDICINE

## 2021-11-17 PROCEDURE — 99999 PR PBB SHADOW E&M-EST. PATIENT-LVL III: CPT | Mod: PBBFAC,,, | Performed by: FAMILY MEDICINE

## 2021-11-17 PROCEDURE — 99999 PR PBB SHADOW E&M-EST. PATIENT-LVL III: ICD-10-PCS | Mod: PBBFAC,,, | Performed by: FAMILY MEDICINE

## 2021-11-17 RX ORDER — AMLODIPINE BESYLATE 10 MG/1
10 TABLET ORAL DAILY
Qty: 90 TABLET | Refills: 3 | OUTPATIENT
Start: 2021-11-17

## 2022-04-07 ENCOUNTER — LAB VISIT (OUTPATIENT)
Dept: LAB | Facility: HOSPITAL | Age: 67
End: 2022-04-07
Attending: INTERNAL MEDICINE
Payer: MEDICARE

## 2022-04-07 ENCOUNTER — TELEPHONE (OUTPATIENT)
Dept: INFECTIOUS DISEASES | Facility: CLINIC | Age: 67
End: 2022-04-07
Payer: MEDICARE

## 2022-04-07 ENCOUNTER — OFFICE VISIT (OUTPATIENT)
Dept: INFECTIOUS DISEASES | Facility: CLINIC | Age: 67
End: 2022-04-07
Payer: MEDICARE

## 2022-04-07 VITALS
HEART RATE: 68 BPM | SYSTOLIC BLOOD PRESSURE: 137 MMHG | BODY MASS INDEX: 20.06 KG/M2 | DIASTOLIC BLOOD PRESSURE: 78 MMHG | WEIGHT: 143.31 LBS | HEIGHT: 71 IN | TEMPERATURE: 98 F

## 2022-04-07 DIAGNOSIS — R91.8 ABNORMAL CT SCAN OF LUNG: ICD-10-CM

## 2022-04-07 DIAGNOSIS — Z91.89 AT RISK FOR SEXUALLY TRANSMITTED DISEASE DUE TO UNPROTECTED SEX: Primary | ICD-10-CM

## 2022-04-07 DIAGNOSIS — Z91.89 AT RISK FOR SEXUALLY TRANSMITTED DISEASE DUE TO UNPROTECTED SEX: ICD-10-CM

## 2022-04-07 DIAGNOSIS — R25.1 TREMORS OF NERVOUS SYSTEM: ICD-10-CM

## 2022-04-07 LAB
ALBUMIN SERPL BCP-MCNC: 4.1 G/DL (ref 3.5–5.2)
ALP SERPL-CCNC: 74 U/L (ref 55–135)
ALT SERPL W/O P-5'-P-CCNC: 13 U/L (ref 10–44)
ANION GAP SERPL CALC-SCNC: 10 MMOL/L (ref 8–16)
AST SERPL-CCNC: 15 U/L (ref 10–40)
BASOPHILS # BLD AUTO: 0.07 K/UL (ref 0–0.2)
BASOPHILS NFR BLD: 0.8 % (ref 0–1.9)
BILIRUB SERPL-MCNC: 0.2 MG/DL (ref 0.1–1)
BUN SERPL-MCNC: 20 MG/DL (ref 8–23)
CALCIUM SERPL-MCNC: 10.1 MG/DL (ref 8.7–10.5)
CHLORIDE SERPL-SCNC: 102 MMOL/L (ref 95–110)
CO2 SERPL-SCNC: 28 MMOL/L (ref 23–29)
CREAT SERPL-MCNC: 0.8 MG/DL (ref 0.5–1.4)
DIFFERENTIAL METHOD: ABNORMAL
EOSINOPHIL # BLD AUTO: 0.2 K/UL (ref 0–0.5)
EOSINOPHIL NFR BLD: 2.7 % (ref 0–8)
ERYTHROCYTE [DISTWIDTH] IN BLOOD BY AUTOMATED COUNT: 12.7 % (ref 11.5–14.5)
EST. GFR  (AFRICAN AMERICAN): >60 ML/MIN/1.73 M^2
EST. GFR  (NON AFRICAN AMERICAN): >60 ML/MIN/1.73 M^2
GLUCOSE SERPL-MCNC: 92 MG/DL (ref 70–110)
HCT VFR BLD AUTO: 42.6 % (ref 40–54)
HGB BLD-MCNC: 14.1 G/DL (ref 14–18)
IMM GRANULOCYTES # BLD AUTO: 0.02 K/UL (ref 0–0.04)
IMM GRANULOCYTES NFR BLD AUTO: 0.2 % (ref 0–0.5)
LYMPHOCYTES # BLD AUTO: 1.6 K/UL (ref 1–4.8)
LYMPHOCYTES NFR BLD: 19.1 % (ref 18–48)
MCH RBC QN AUTO: 30.7 PG (ref 27–31)
MCHC RBC AUTO-ENTMCNC: 33.1 G/DL (ref 32–36)
MCV RBC AUTO: 93 FL (ref 82–98)
MONOCYTES # BLD AUTO: 0.6 K/UL (ref 0.3–1)
MONOCYTES NFR BLD: 7.1 % (ref 4–15)
NEUTROPHILS # BLD AUTO: 5.9 K/UL (ref 1.8–7.7)
NEUTROPHILS NFR BLD: 70.1 % (ref 38–73)
NRBC BLD-RTO: 0 /100 WBC
PLATELET # BLD AUTO: 291 K/UL (ref 150–450)
PMV BLD AUTO: 10 FL (ref 9.2–12.9)
POTASSIUM SERPL-SCNC: 4.6 MMOL/L (ref 3.5–5.1)
PROT SERPL-MCNC: 7.7 G/DL (ref 6–8.4)
RBC # BLD AUTO: 4.59 M/UL (ref 4.6–6.2)
SODIUM SERPL-SCNC: 140 MMOL/L (ref 136–145)
WBC # BLD AUTO: 8.46 K/UL (ref 3.9–12.7)

## 2022-04-07 PROCEDURE — 86780 TREPONEMA PALLIDUM: CPT | Performed by: INTERNAL MEDICINE

## 2022-04-07 PROCEDURE — 86592 SYPHILIS TEST NON-TREP QUAL: CPT | Performed by: INTERNAL MEDICINE

## 2022-04-07 PROCEDURE — 99999 PR PBB SHADOW E&M-EST. PATIENT-LVL IV: ICD-10-PCS | Mod: PBBFAC,,,

## 2022-04-07 PROCEDURE — 86706 HEP B SURFACE ANTIBODY: CPT | Performed by: INTERNAL MEDICINE

## 2022-04-07 PROCEDURE — 87340 HEPATITIS B SURFACE AG IA: CPT | Performed by: INTERNAL MEDICINE

## 2022-04-07 PROCEDURE — 86803 HEPATITIS C AB TEST: CPT | Performed by: INTERNAL MEDICINE

## 2022-04-07 PROCEDURE — 36415 COLL VENOUS BLD VENIPUNCTURE: CPT | Performed by: INTERNAL MEDICINE

## 2022-04-07 PROCEDURE — 99214 OFFICE O/P EST MOD 30 MIN: CPT | Mod: PBBFAC | Performed by: INTERNAL MEDICINE

## 2022-04-07 PROCEDURE — 80053 COMPREHEN METABOLIC PANEL: CPT | Performed by: INTERNAL MEDICINE

## 2022-04-07 PROCEDURE — 86790 VIRUS ANTIBODY NOS: CPT | Performed by: INTERNAL MEDICINE

## 2022-04-07 PROCEDURE — 99999 PR PBB SHADOW E&M-EST. PATIENT-LVL IV: CPT | Mod: PBBFAC,,,

## 2022-04-07 PROCEDURE — 85025 COMPLETE CBC W/AUTO DIFF WBC: CPT | Performed by: INTERNAL MEDICINE

## 2022-04-07 PROCEDURE — 87389 HIV-1 AG W/HIV-1&-2 AB AG IA: CPT | Performed by: INTERNAL MEDICINE

## 2022-04-07 PROCEDURE — 87491 CHLMYD TRACH DNA AMP PROBE: CPT | Mod: 59 | Performed by: INTERNAL MEDICINE

## 2022-04-07 NOTE — PROGRESS NOTES
"Infectious Disease Clinic Note  04/07/2022       Subjective:       Patient ID: Chuy Stevens is a 66 y.o. male being seen for an new visit.    Chief Complaint: HIV Positive/AIDS    HPI  Mr. Stevens is a 66 M pt with hx of HTN who presented for an initial visit for "HIV". Per chart, pt tested negative for HIV back in 2020 and is not aware of ever having tested positive for HIV. Reports he is here for evaluation of worsening dyspnea and tremors. Notes respiratory symptoms started in 2020. He was hospitalized 2/11/2020- 2/12/2020 with likely pneumonia. CT showed tree in bud opacities, regions of consolidative opacity with central lucency, and emphysema. Was sent home to complete a 7 day course abx with levaquin with plan for repeat CT in 3 mths. Unclear if CT was ever repeated. Has an occasional cough and wheezing. No hemoptysis. Reports having received COVID and Pneumonia vaccines at an outside clinic. Notes he recently received Tdap as well.     Pt also complains of worsening intention tremors that are affecting his work and even ability to feed himself.     Social:  Born in [place]. Moved to [facility] in his 20's. No foreign travel. Works as a . He smoked cigarettes until he was 26y/o. Smokes marihuana, but denies using any other illicit drugs. Has sex with males and females. No known hx of STIs.    No family history on file.  Social History     Socioeconomic History    Marital status: Single   Tobacco Use    Smoking status: Never Smoker    Smokeless tobacco: Never Used   Substance and Sexual Activity    Alcohol use: Yes     Comment: frequent drinker    Drug use: Yes     Types: Marijuana     Comment: occasionally smokes marijuana     No past surgical history on file.    Patient's Medications   New Prescriptions    No medications on file   Previous Medications    ALBUTEROL (PROVENTIL/VENTOLIN HFA) 90 MCG/ACTUATION INHALER    Inhale 2 puffs into the lungs every 6 (six) hours as needed for Wheezing.    " "ALBUTEROL (PROVENTIL/VENTOLIN HFA) 90 MCG/ACTUATION INHALER    Inhale 1-2 puffs into the lungs every 6 (six) hours as needed for Wheezing.    AMLODIPINE (NORVASC) 10 MG TABLET    Take 1 tablet (10 mg total) by mouth once daily.    ATROVENT HFA 17 MCG/ACTUATION INHALER    INHALE 2 PUFFS INTO THE LUNGS EVERY 6 HOURS FOR RESCUE   Modified Medications    No medications on file   Discontinued Medications    No medications on file       Patient Active Problem List    Diagnosis Date Noted    COPD (chronic obstructive pulmonary disease) 11/17/2021    Cachexia 02/10/2020    Pulmonary nodules 02/10/2020    Hypertension 02/10/2020    Fatigue 02/10/2020    Abnormal chest x-ray 02/10/2020       Review of Systems   Review of Systems   Constitutional: Positive for malaise/fatigue. Negative for chills, diaphoresis, fever and weight loss.   HENT: Negative for congestion and sore throat.    Respiratory: Positive for cough and shortness of breath. Negative for hemoptysis (chronic).    Cardiovascular: Negative for chest pain and leg swelling.   Gastrointestinal: Negative for abdominal pain, diarrhea, nausea and vomiting.   Genitourinary: Negative for dysuria and frequency.   Musculoskeletal: Positive for joint pain. Negative for myalgias.   Neurological: Negative for dizziness and headaches.   Psychiatric/Behavioral: Negative for memory loss.           Objective:      /78 (BP Location: Left arm)   Pulse 68   Temp 97.7 °F (36.5 °C) (Oral)   Ht 5' 11" (1.803 m)   Wt 65 kg (143 lb 4.8 oz)   BMI 19.99 kg/m²   Estimated body mass index is 19.99 kg/m² as calculated from the following:    Height as of this encounter: 5' 11" (1.803 m).    Weight as of this encounter: 65 kg (143 lb 4.8 oz).    Physical Exam  Vitals reviewed.   Constitutional:       Appearance: Normal appearance.   HENT:      Head: Normocephalic and atraumatic.      Nose: Nose normal.      Mouth/Throat:      Mouth: Mucous membranes are moist.      Pharynx: No " oropharyngeal exudate.   Eyes:      Conjunctiva/sclera: Conjunctivae normal.      Pupils: Pupils are equal, round, and reactive to light.   Cardiovascular:      Rate and Rhythm: Normal rate and regular rhythm.   Pulmonary:      Effort: Pulmonary effort is normal.      Comments: Mild wheezing at rt lung  Abdominal:      General: Abdomen is flat. There is no distension.      Palpations: Abdomen is soft.      Tenderness: There is no abdominal tenderness.   Musculoskeletal:         General: No swelling. Normal range of motion.      Cervical back: Normal range of motion and neck supple.   Skin:     General: Skin is warm.      Coloration: Skin is not jaundiced.   Neurological:      General: No focal deficit present.      Mental Status: He is alert and oriented to person, place, and time.      Comments: B/l hand intention tremors L>R   Psychiatric:         Mood and Affect: Mood normal.         Behavior: Behavior normal.         Assessment:         1. At risk for sexually transmitted disease due to unprotected sex  HIV 1/2 Ag/Ab (4th Gen)    RPR    FTA antibodies, IgG and IgM    Hepatitis C Antibody    Hepatitis B Surface Ab, Qualitative    Hepatitis B Surface Antigen    Hepatitis A antibody, IgG    Quantiferon Gold TB    CBC Auto Differential    Comprehensive Metabolic Panel    C. trachomatis/N. gonorrhoeae by AMP DNA [facility]; Urine    Ambulatory referral/consult to Pulmonology    Ambulatory referral/consult to Internal Medicine    C.trach/N.gonor AMP RNA, (Non-Genital)    C.trach/N.gonor AMP RNA, (Non-Genital)    C.trach/N.gonor AMP RNA, (Non-Genital)    C.trach/N.gonor AMP RNA, (Non-Genital)    CANCELED: C.trach/N.gonor AMP RNA, (Non-Genital)    CANCELED: C.trach/N.gonor AMP RNA, (Non-Genital)    CANCELED: C.trach/N.gonor AMP RNA, (Non-Genital)    CANCELED: C.trach/N.gonor AMP RNA, (Non-Genital)    CANCELED: C.trach/N.gonor AMP RNA, (Non-Genital)   2. Abnormal CT scan of lung  Ambulatory referral/consult to Pulmonology     Ambulatory referral/consult to Internal Medicine   3. Tremors of nervous system  Ambulatory referral/consult to Neurology         Plan:       Chuy was seen today for hiv positive/aids.    Diagnoses and all orders for this visit:    At risk for sexually transmitted disease due to unprotected sex  Screen for STIs:  -HIV, RPR, FTA, Hep B, chlamydia and gonorrhea (urine, rectal and throat)   Hep C screening  Hep A vaccine pending serology    Abnormal CT scan of lung  Hx of worsening dyspnea with abnormal CT in 2020   Sats at goal on RA without cough or symptoms of pna  -     Ambulatory referral/consult to Pulmonology; Future  -     Ambulatory referral/consult to Internal Medicine; Future      Tremors of nervous system  -     Ambulatory referral/consult to Neurology; Future    HTN  -refer to internal medicine to establish care with PCP      Case discussed with Dr. Alexandre.  Gracy Josue PGY-5  Infectious Disease

## 2022-04-08 LAB
RPR SER QL: NORMAL
T PALLIDUM AB SER QL IF: NORMAL

## 2022-04-09 LAB
C TRACH RRNA SPEC QL NAA+PROBE: NEGATIVE
N GONORRHOEA RRNA SPEC QL NAA+PROBE: NEGATIVE
N.GONORROHEAE, AMP RNA SOURCE: NORMAL
SPECIMEN SOURCE: NORMAL

## 2022-04-11 ENCOUNTER — TELEPHONE (OUTPATIENT)
Dept: INFECTIOUS DISEASES | Facility: CLINIC | Age: 67
End: 2022-04-11
Payer: MEDICARE

## 2022-04-11 LAB
HAV IGG SER QL IA: POSITIVE
HBV SURFACE AB SER-ACNC: POSITIVE M[IU]/ML
HBV SURFACE AG SERPL QL IA: NEGATIVE
HCV AB SERPL QL IA: NEGATIVE
HIV 1+2 AB+HIV1 P24 AG SERPL QL IA: NEGATIVE

## 2022-04-21 ENCOUNTER — TELEPHONE (OUTPATIENT)
Dept: INFECTIOUS DISEASES | Facility: HOSPITAL | Age: 67
End: 2022-04-21
Payer: MEDICARE

## 2022-05-12 ENCOUNTER — OFFICE VISIT (OUTPATIENT)
Dept: PULMONOLOGY | Facility: CLINIC | Age: 67
End: 2022-05-12
Payer: MEDICARE

## 2022-05-12 VITALS
BODY MASS INDEX: 19.94 KG/M2 | HEIGHT: 71 IN | SYSTOLIC BLOOD PRESSURE: 110 MMHG | OXYGEN SATURATION: 96 % | HEART RATE: 67 BPM | DIASTOLIC BLOOD PRESSURE: 82 MMHG | WEIGHT: 142.44 LBS

## 2022-05-12 DIAGNOSIS — J43.2 CENTRILOBULAR EMPHYSEMA: Primary | ICD-10-CM

## 2022-05-12 DIAGNOSIS — R91.8 PULMONARY NODULES: ICD-10-CM

## 2022-05-12 PROCEDURE — 99204 OFFICE O/P NEW MOD 45 MIN: CPT | Mod: S$GLB,,, | Performed by: INTERNAL MEDICINE

## 2022-05-12 PROCEDURE — 3008F BODY MASS INDEX DOCD: CPT | Mod: CPTII,S$GLB,, | Performed by: INTERNAL MEDICINE

## 2022-05-12 PROCEDURE — 3079F DIAST BP 80-89 MM HG: CPT | Mod: CPTII,S$GLB,, | Performed by: INTERNAL MEDICINE

## 2022-05-12 PROCEDURE — 99204 PR OFFICE/OUTPT VISIT, NEW, LEVL IV, 45-59 MIN: ICD-10-PCS | Mod: S$GLB,,, | Performed by: INTERNAL MEDICINE

## 2022-05-12 PROCEDURE — 3074F SYST BP LT 130 MM HG: CPT | Mod: CPTII,S$GLB,, | Performed by: INTERNAL MEDICINE

## 2022-05-12 PROCEDURE — 1160F PR REVIEW ALL MEDS BY PRESCRIBER/CLIN PHARMACIST DOCUMENTED: ICD-10-PCS | Mod: CPTII,S$GLB,, | Performed by: INTERNAL MEDICINE

## 2022-05-12 PROCEDURE — 1160F RVW MEDS BY RX/DR IN RCRD: CPT | Mod: CPTII,S$GLB,, | Performed by: INTERNAL MEDICINE

## 2022-05-12 PROCEDURE — 1159F MED LIST DOCD IN RCRD: CPT | Mod: CPTII,S$GLB,, | Performed by: INTERNAL MEDICINE

## 2022-05-12 PROCEDURE — 1126F AMNT PAIN NOTED NONE PRSNT: CPT | Mod: CPTII,S$GLB,, | Performed by: INTERNAL MEDICINE

## 2022-05-12 PROCEDURE — 3079F PR MOST RECENT DIASTOLIC BLOOD PRESSURE 80-89 MM HG: ICD-10-PCS | Mod: CPTII,S$GLB,, | Performed by: INTERNAL MEDICINE

## 2022-05-12 PROCEDURE — 99999 PR PBB SHADOW E&M-EST. PATIENT-LVL III: ICD-10-PCS | Mod: PBBFAC,,, | Performed by: INTERNAL MEDICINE

## 2022-05-12 PROCEDURE — 1126F PR PAIN SEVERITY QUANTIFIED, NO PAIN PRESENT: ICD-10-PCS | Mod: CPTII,S$GLB,, | Performed by: INTERNAL MEDICINE

## 2022-05-12 PROCEDURE — 3008F PR BODY MASS INDEX (BMI) DOCUMENTED: ICD-10-PCS | Mod: CPTII,S$GLB,, | Performed by: INTERNAL MEDICINE

## 2022-05-12 PROCEDURE — 1159F PR MEDICATION LIST DOCUMENTED IN MEDICAL RECORD: ICD-10-PCS | Mod: CPTII,S$GLB,, | Performed by: INTERNAL MEDICINE

## 2022-05-12 PROCEDURE — 99999 PR PBB SHADOW E&M-EST. PATIENT-LVL III: CPT | Mod: PBBFAC,,, | Performed by: INTERNAL MEDICINE

## 2022-05-12 PROCEDURE — 3074F PR MOST RECENT SYSTOLIC BLOOD PRESSURE < 130 MM HG: ICD-10-PCS | Mod: CPTII,S$GLB,, | Performed by: INTERNAL MEDICINE

## 2022-05-12 RX ORDER — FLUTICASONE FUROATE, UMECLIDINIUM BROMIDE AND VILANTEROL TRIFENATATE 100; 62.5; 25 UG/1; UG/1; UG/1
POWDER RESPIRATORY (INHALATION)
COMMUNITY
Start: 2022-04-20

## 2022-05-12 NOTE — PROGRESS NOTES
Subjective:       Patient ID: Chuy Stevens is a 66 y.o. male.    Chief Complaint: COPD    HPI:   Chuy Stevens is a 66 y.o. male new to me who presents for evaluation of COPD.    Believed he had covid 2/2020. Dx w/ emphysema at that time based on CT Chest. Also c/f NTM.     Started on triple therapy as well as CHELSY and CRICKET. Never underwent PFTs. Had a COPD exacerbation 1x in the last year. Reports a daily cough, productive of clear sputum. Reports GRAY after PNA 2/2020 that has steadily improved. Uses inhalers daily.    Denies chest pain with activity.     Denies f/c/ns, malaise, fatigue, weight loss. Did lose weight last October, but gained back 10-15lbs since then.    Denies rashes, myalgias, arthralgias, hair/nail changes, dysphagia, eye changes, raynauds, mucosal ulcerations    Exposures:  Work-   Tobacco- quit at the age of 27  Inhalational Agents- MJ daily, 3 joints per day  Mold- Denies  Pets- Denies  Birds- Denies  Medications- Denies  Travel- Denies  TB- Denies     Childhood history of Lung Disease: Denies    Review of Systems   Constitutional: Negative for fever, chills, weight loss, activity change, appetite change, night sweats and weakness.   HENT: Negative for postnasal drip, sinus pressure, voice change and congestion.    Eyes: Negative for redness.   Respiratory: Positive for cough, shortness of breath, dyspnea on extertion and use of rescue inhaler. Negative for sputum production, wheezing, previous hospitialization due to pulmonary problems, somnolence and Paroxysmal Nocturnal Dyspnea.    Cardiovascular: Negative for chest pain, palpitations and leg swelling.   Musculoskeletal: Negative for joint swelling and myalgias.   Skin: Negative for rash.   Gastrointestinal: Negative for acid reflux.   Neurological: Negative for syncope and weakness.   Psychiatric/Behavioral: Negative for sleep disturbance.         Social History     Tobacco Use    Smoking status: Never Smoker     "Smokeless tobacco: Never Used   Substance Use Topics    Alcohol use: Yes     Comment: frequent drinker       Review of patient's allergies indicates:  No Known Allergies  Past Medical History:   Diagnosis Date    Hypertension      No past surgical history on file.  Current Outpatient Medications on File Prior to Visit   Medication Sig    albuterol (PROVENTIL/VENTOLIN HFA) 90 mcg/actuation inhaler Inhale 2 puffs into the lungs every 6 (six) hours as needed for Wheezing.    albuterol (PROVENTIL/VENTOLIN HFA) 90 mcg/actuation inhaler Inhale 1-2 puffs into the lungs every 6 (six) hours as needed for Wheezing.    amLODIPine (NORVASC) 10 MG tablet Take 1 tablet (10 mg total) by mouth once daily.    ATROVENT HFA 17 mcg/actuation inhaler INHALE 2 PUFFS INTO THE LUNGS EVERY 6 HOURS FOR RESCUE    TRELEGY ELLIPTA 100-62.5-25 mcg DsDv      No current facility-administered medications on file prior to visit.       Objective:      Vitals:    05/12/22 1309   BP: 110/82   BP Location: Right arm   Patient Position: Sitting   Pulse: 67   SpO2: 96%   Weight: 64.6 kg (142 lb 6.7 oz)   Height: 5' 11" (1.803 m)     Physical Exam   Constitutional: He is oriented to person, place, and time. He appears well-developed and well-nourished.   HENT:   Nose: No mucosal edema.   Mouth/Throat: Oropharynx is clear and moist. Mallampati Score: I.   Neck: No tracheal deviation present.   Cardiovascular: Normal rate, regular rhythm and intact distal pulses.   Pulmonary/Chest: Normal expansion, symmetric chest wall expansion, effort normal and breath sounds normal. No respiratory distress. He has no wheezes. He has no rhonchi. He has no rales.   Abdominal: He exhibits no distension.   Musculoskeletal:         General: No edema.      Cervical back: Neck supple.   Lymphadenopathy: No supraclavicular adenopathy is present.     He has no cervical adenopathy.   Neurological: He is alert and oriented to person, place, and time.   Skin: Skin is warm and " dry. No cyanosis or erythema. Nails show no clubbing.   Psychiatric: He has a normal mood and affect.   Nursing note and vitals reviewed.      Personal Diagnostic Review    Laboratory:  4/7/22 Bicarb- 28  Eosinophils- no elevations      CT Chest 2/10/20- Images personally reviewed. I agree w/ the radiologist who notes  1. No pulmonary thromboembolism.  2. Pulmonary emphysematous change noting peribronchial thickening centrally, could reflect bronchial airway infection or inflammation.  3. Multifocal tree-in-bud type nodules with additional regions of consolidative opacity with central lucency, may reflect early cavitation.  The largest focus is within the right lower lobe.  Findings are nonspecific and could reflect sequela of multifocal infection, correlation is recommended.  Given background emphysema, malignancy can not be excluded in this setting.  Correlation with patient history and clinical symptoms is recommended.  Short-term follow-up, no greater than 3 months, is recommended to ensure resolution and assess for progression.  Comparison with any previous examinations would be helpful.    CXR 11/11/21- Images personally reviewed and compared to priors. I agree w/ the radiologist who notes;  Single frontal view of the chest shows advanced emphysematous changes which were seen previously.  There is hyperinflation with flattening of the diaphragms.  There is interstitial scarring appreciated.  No indication of a consolidative process or pleural effusion.  There is a pulmonary nodule appreciated the central aspect of the left lung which was present previously remains unchanged.  It was also documented on a previous chest x-ray dated 15th June 2012.      Pulmonary Function Tests 5/12/2022   SpO2 96   Height 71   Weight 2278.67   BMI (Calculated) 19.9   Some recent data might be hidden           Assessment:     Problem List Items Addressed This Visit        Pulmonary    Pulmonary nodules     Follow up CT Chest  ordered           Relevant Orders    CT Chest Without Contrast    COPD (chronic obstructive pulmonary disease) - Primary     On triple therapy with prn CHELSY and CRICKET. Feels improved.     - Cont current inhalers  - Check PFTs  - Check a CT Chest to follow up nodules/TIB opacities  - UTD on vaccines  - Encouraged cessation of MJ           Relevant Orders    Spirometry without Bronchodilator    Lung Volumes    DLCO-Carbon Monoxide Diffusing Capacity

## 2022-05-12 NOTE — ASSESSMENT & PLAN NOTE
On triple therapy with prn CHELSY and CRICKET. Feels improved.     - Cont current inhalers  - Check PFTs  - Check a CT Chest to follow up nodules/TIB opacities  - UTD on vaccines  - Encouraged cessation of MJ

## 2022-09-01 ENCOUNTER — HOSPITAL ENCOUNTER (OUTPATIENT)
Dept: RADIOLOGY | Facility: HOSPITAL | Age: 67
Discharge: HOME | End: 2022-09-01
Attending: ORTHOPAEDIC SURGERY
Payer: MEDICARE

## 2022-09-01 ENCOUNTER — OFFICE VISIT (OUTPATIENT)
Dept: SPORTS MEDICINE | Facility: CLINIC | Age: 67
End: 2022-09-01
Payer: MEDICARE

## 2022-09-01 VITALS
SYSTOLIC BLOOD PRESSURE: 101 MMHG | WEIGHT: 143 LBS | DIASTOLIC BLOOD PRESSURE: 76 MMHG | HEART RATE: 96 BPM | BODY MASS INDEX: 20.02 KG/M2 | HEIGHT: 71 IN

## 2022-09-01 DIAGNOSIS — M25.561 RIGHT KNEE PAIN, UNSPECIFIED CHRONICITY: ICD-10-CM

## 2022-09-01 DIAGNOSIS — M17.11 PRIMARY OSTEOARTHRITIS OF RIGHT KNEE: ICD-10-CM

## 2022-09-01 DIAGNOSIS — M25.561 ACUTE PAIN OF RIGHT KNEE: Primary | ICD-10-CM

## 2022-09-01 DIAGNOSIS — M25.471 RIGHT ANKLE SWELLING: ICD-10-CM

## 2022-09-01 PROCEDURE — 1125F PR PAIN SEVERITY QUANTIFIED, PAIN PRESENT: ICD-10-PCS | Mod: CPTII,S$GLB,, | Performed by: ORTHOPAEDIC SURGERY

## 2022-09-01 PROCEDURE — 99999 PR PBB SHADOW E&M-EST. PATIENT-LVL III: CPT | Mod: PBBFAC,,, | Performed by: ORTHOPAEDIC SURGERY

## 2022-09-01 PROCEDURE — 20610 DRAIN/INJ JOINT/BURSA W/O US: CPT | Mod: RT,S$GLB,, | Performed by: ORTHOPAEDIC SURGERY

## 2022-09-01 PROCEDURE — 1125F AMNT PAIN NOTED PAIN PRSNT: CPT | Mod: CPTII,S$GLB,, | Performed by: ORTHOPAEDIC SURGERY

## 2022-09-01 PROCEDURE — 99204 PR OFFICE/OUTPT VISIT, NEW, LEVL IV, 45-59 MIN: ICD-10-PCS | Mod: 25,S$GLB,, | Performed by: ORTHOPAEDIC SURGERY

## 2022-09-01 PROCEDURE — 3008F PR BODY MASS INDEX (BMI) DOCUMENTED: ICD-10-PCS | Mod: CPTII,S$GLB,, | Performed by: ORTHOPAEDIC SURGERY

## 2022-09-01 PROCEDURE — 1159F PR MEDICATION LIST DOCUMENTED IN MEDICAL RECORD: ICD-10-PCS | Mod: CPTII,S$GLB,, | Performed by: ORTHOPAEDIC SURGERY

## 2022-09-01 PROCEDURE — 3008F BODY MASS INDEX DOCD: CPT | Mod: CPTII,S$GLB,, | Performed by: ORTHOPAEDIC SURGERY

## 2022-09-01 PROCEDURE — 20610 LARGE JOINT ASPIRATION/INJECTION: R KNEE: ICD-10-PCS | Mod: RT,S$GLB,, | Performed by: ORTHOPAEDIC SURGERY

## 2022-09-01 PROCEDURE — 99204 OFFICE O/P NEW MOD 45 MIN: CPT | Mod: 25,S$GLB,, | Performed by: ORTHOPAEDIC SURGERY

## 2022-09-01 PROCEDURE — 3078F PR MOST RECENT DIASTOLIC BLOOD PRESSURE < 80 MM HG: ICD-10-PCS | Mod: CPTII,S$GLB,, | Performed by: ORTHOPAEDIC SURGERY

## 2022-09-01 PROCEDURE — 3078F DIAST BP <80 MM HG: CPT | Mod: CPTII,S$GLB,, | Performed by: ORTHOPAEDIC SURGERY

## 2022-09-01 PROCEDURE — 3288F FALL RISK ASSESSMENT DOCD: CPT | Mod: CPTII,S$GLB,, | Performed by: ORTHOPAEDIC SURGERY

## 2022-09-01 PROCEDURE — 73564 X-RAY EXAM KNEE 4 OR MORE: CPT | Mod: TC,50

## 2022-09-01 PROCEDURE — 73610 XR ANKLE COMPLETE 3 VIEW RIGHT: ICD-10-PCS | Mod: 26,RT,, | Performed by: RADIOLOGY

## 2022-09-01 PROCEDURE — 73610 X-RAY EXAM OF ANKLE: CPT | Mod: TC,RT

## 2022-09-01 PROCEDURE — 73564 X-RAY EXAM KNEE 4 OR MORE: CPT | Mod: 26,50,, | Performed by: RADIOLOGY

## 2022-09-01 PROCEDURE — 3074F PR MOST RECENT SYSTOLIC BLOOD PRESSURE < 130 MM HG: ICD-10-PCS | Mod: CPTII,S$GLB,, | Performed by: ORTHOPAEDIC SURGERY

## 2022-09-01 PROCEDURE — 3074F SYST BP LT 130 MM HG: CPT | Mod: CPTII,S$GLB,, | Performed by: ORTHOPAEDIC SURGERY

## 2022-09-01 PROCEDURE — 1101F PT FALLS ASSESS-DOCD LE1/YR: CPT | Mod: CPTII,S$GLB,, | Performed by: ORTHOPAEDIC SURGERY

## 2022-09-01 PROCEDURE — 3288F PR FALLS RISK ASSESSMENT DOCUMENTED: ICD-10-PCS | Mod: CPTII,S$GLB,, | Performed by: ORTHOPAEDIC SURGERY

## 2022-09-01 PROCEDURE — 99999 PR PBB SHADOW E&M-EST. PATIENT-LVL III: ICD-10-PCS | Mod: PBBFAC,,, | Performed by: ORTHOPAEDIC SURGERY

## 2022-09-01 PROCEDURE — 1101F PR PT FALLS ASSESS DOC 0-1 FALLS W/OUT INJ PAST YR: ICD-10-PCS | Mod: CPTII,S$GLB,, | Performed by: ORTHOPAEDIC SURGERY

## 2022-09-01 PROCEDURE — 73564 XR KNEE ORTHO BILAT WITH FLEXION: ICD-10-PCS | Mod: 26,50,, | Performed by: RADIOLOGY

## 2022-09-01 PROCEDURE — 1159F MED LIST DOCD IN RCRD: CPT | Mod: CPTII,S$GLB,, | Performed by: ORTHOPAEDIC SURGERY

## 2022-09-01 PROCEDURE — 73610 X-RAY EXAM OF ANKLE: CPT | Mod: 26,RT,, | Performed by: RADIOLOGY

## 2022-09-01 RX ADMIN — TRIAMCINOLONE ACETONIDE 80 MG: 40 INJECTION, SUSPENSION INTRA-ARTICULAR; INTRAMUSCULAR at 11:09

## 2022-09-01 NOTE — PROCEDURES
Large Joint Aspiration/Injection: R knee    Date/Time: 9/1/2022 11:00 AM  Performed by: Cabrera Perez PA-C  Authorized by: Cabrera Perez PA-C     Consent Done?:  Yes (Verbal)  Indications:  Arthritis and pain  Site marked: the procedure site was marked    Timeout: prior to procedure the correct patient, procedure, and site was verified      Local anesthesia used?: Yes    Local anesthetic:  Lidocaine spray    Details:  Needle Size:  18 G and 25 G  Ultrasonic Guidance for needle placement?: No    Approach:  Lateral  Location:  Knee  Site:  R knee  Medications:  80 mg triamcinolone acetonide 40 mg/mL  Aspirate amount (mL):  0  Patient tolerance:  Patient tolerated the procedure well with no immediate complications

## 2022-09-01 NOTE — PROGRESS NOTES
Subjective:          Chief Complaint: Chuy Stevens is a 67 y.o. male who had concerns including Pain of the Right Knee.    HPI    Patient presents to clinic with acute right knee pain x 10 days. Patient states the pain began gradually and is localized along the medial aspect of his right knee. He denies any [facility] or traumatic event.  Pain is made worse when squatting up and down and going up and down stairs.  He rates the pain as 1/10 and made worse with the activities described above.  He has attempted multiple conservative measures that include activity modification, ice & elevation, and oral medications (Tylenol), with mild relief. He denies any mechanical symptoms to include locking and catching, but does admit to some subjective instability.  Is affecting ADLs and limiting desired level of activity. Denies numbness, tingling, radiation, and inability to bear weight. He is here today to discuss treatment options.    No previous surgeries or trauma on right knee    Additionally, he reports moderate swelling in his right ankle. He reports no pain, limitations, limited ROM, or any symptoms other than the swelling, for which she 1st noticed today.        Review of Systems   Constitutional: Negative.   HENT: Negative.     Eyes: Negative.    Cardiovascular: Negative.    Respiratory: Negative.     Endocrine: Negative.    Hematologic/Lymphatic: Negative.    Skin: Negative.    Musculoskeletal:  Positive for arthritis, joint pain, joint swelling and stiffness. Negative for falls and muscle weakness.   Neurological: Negative.    Psychiatric/Behavioral: Negative.     Allergic/Immunologic: Negative.      Pain Related Questions  Over the past 3 days, what was your average pain during activity? (I.e. running, jogging, walking, climbing stairs, getting dressed, ect.): 5  Over the past 3 days, what was your highest pain level?: 4  Over the past 3 days, what was your lowest pain level? : 3    Other  How many nights a week  are you awakened by your affected body part?: 0  Was the patient's HEIGHT measured or patient reported?: Patient Reported  Was the patient's WEIGHT measured or patient reported?: Measured      Objective:        General: Chuy is well-developed, well-nourished, appears stated age, in no acute distress, alert and oriented to time, place and person.     General    Nursing note and vitals reviewed.  Constitutional: He is oriented to person, place, and time. He appears well-developed and well-nourished. No distress.   HENT:   Head: Normocephalic and atraumatic.   Nose: Nose normal.   Eyes: EOM are normal.   Cardiovascular:  Intact distal pulses.            Pulmonary/Chest: Effort normal. No respiratory distress.   Neurological: He is alert and oriented to person, place, and time.   Psychiatric: He has a normal mood and affect. His behavior is normal. Judgment and thought content normal.         Right Ankle/Foot Exam     Inspection   Scars: absent  Deformity: absent  Erythema: absent  Bruising: Ankle - absent Foot - absent  Effusion: Ankle - present Foot - absent  Atrophy: Ankle - absent Foot - absent    Swelling   The patient is swollen on the lateral talar dome and medial talar dome.    Range of Motion   Ankle Joint   Dorsiflexion:  20   Plantar flexion:  40   Subtalar Joint   Inversion:  20   Eversion:  10     Muscle Strength   The patient has normal right ankle strength.    Other   Sensation: normal    Comments:  Patient has full strength in ROM of right ankle.  Patient was non tender to palpation.      Right Knee Exam     Inspection   Erythema: absent  Scars: absent  Swelling: absent  Effusion: present  Deformity: absent  Bruising: absent    Tenderness   The patient is tender to palpation of the medial joint line and patella.    Range of Motion   Extension:  -5   Flexion:  110     Tests   Meniscus   Padmini:  Medial - positive Lateral - negative  Ligament Examination   Lachman: normal (-1 to 2mm)   PCL-Posterior  Drawer: normal (0 to 2mm)     MCL - Valgus: normal (0 to 2mm)  LCL - Varus: normal  Patella   Patellar apprehension: negative  Passive Patellar Tilt: neutral  Patellar Tracking: normal  Patellar Grind: positive    Other   Muscle Tightness: hamstring tightness  Sensation: normal    Left Knee Exam     Inspection   Erythema: absent  Scars: absent  Swelling: absent  Effusion: absent  Deformity: absent  Bruising: absent    Tenderness   The patient is experiencing no tenderness.     Range of Motion   Extension:  -5   Flexion:  140     Tests   Meniscus   Padmini:  Medial - negative Lateral - negative  Stability   Lachman: normal (-1 to 2mm)   PCL-Posterior Drawer: normal (0 to 2mm)  MCL - Valgus: normal (0 to 2mm)  LCL - Varus: normal (0 to 2mm)  Patella   Patellar apprehension: negative  Passive Patellar Tilt: neutral  Patellar Tracking: normal  Patellar Grind: negative    Other   Muscle Tightness: hamstring tightness  Sensation: normal    Muscle Strength   Right Lower Extremity   Quadriceps:  5/5   Hamstrin/5   Left Lower Extremity   Quadriceps:  5/5   Hamstrin/5     Vascular Exam     Right Pulses  Dorsalis Pedis:      2+  Posterior Tibial:      2+        Left Pulses  Dorsalis Pedis:      2+  Posterior Tibial:      2+        Edema  Right Lower Leg: present  Left Lower Leg: absent    Radiographs right knee     My interpretation:     Significant joint space narrowing of the medial compartment with chondrocalcinosis visualized      Radiographs right ankle     My interpretation:    Moderate joint effusion    No other signs of fracture, contusion, swelling, DJD, or any other bony abnormalities noted.          Assessment:       Encounter Diagnoses   Name Primary?    Acute pain of right knee Yes    Right ankle swelling     Primary osteoarthritis of right knee           Plan:           1. I made the decision to order new imaging of the extremity or extremities evaluated. I independently reviewed and interpreted the  radiographs and/or MRIs today. These images were shown to the patient where I then discussed my findings in detail.    2. We have discussed a variety of treatment options including medications, injections, physical therapy and other alternative treatments. I also explained the indications, risks and benefits of surgery. Patient's pain is refractory HEP, conservative management, and NSAIDs. Had a lengthy discussion about osteoarthritis in the knees to include the primary causes to include excessive weight, trauma, genetics, and muscle weakness as well as the different forms of treatment used to help alleviate symptoms including CSI, physical therapy, and Visco supplementation injections.  Patient states he works out quite often, and does not believe PT will give him much benefit.  At this time, Pt would like to proceed with CSI injection.    We also discussed the chondrocalcinosis that was visualized on his x-ray which can occasionally be accompanied with pseudo ago.  Because I was unable to aspirate any fluid I cannot send any synovial fluid to test for this.  May re-attempt aspiration in the future, for which we consented then.    3. Ice compress to the affected area 2-3x a day for 15-20 minutes as needed for pain management.  Ibuprofen prn pain.    4. Aspiration/CSI was administered today in the right knee.  Patient tolerated the procedure well without any adverse effects or complications.    5. RTC to see Tc Perez PA-C in 2 weeks for 6 weeks for follow-up.  Will reassess right knee pain and determine if patient could benefit from viscosupplementation and possible PT.        All of the patient's questions were answered. Patient was advised to call the clinic or contact me through the patient portal for any questions or concerns.       Medical Dictation software was used during the dictation of portions or the entirety of this medical record.  Phonetic or grammatic errors may exist due to the use of this  software. For clarification, refer to the author of the document.                    Patient questionnaires may have been collected.

## 2022-09-09 RX ORDER — TRIAMCINOLONE ACETONIDE 40 MG/ML
80 INJECTION, SUSPENSION INTRA-ARTICULAR; INTRAMUSCULAR
Status: DISCONTINUED | OUTPATIENT
Start: 2022-09-01 | End: 2022-09-09 | Stop reason: HOSPADM

## 2022-09-28 ENCOUNTER — TELEPHONE (OUTPATIENT)
Dept: SPORTS MEDICINE | Facility: CLINIC | Age: 67
End: 2022-09-28
Payer: MEDICARE

## 2022-10-06 ENCOUNTER — OFFICE VISIT (OUTPATIENT)
Dept: SPORTS MEDICINE | Facility: CLINIC | Age: 67
End: 2022-10-06
Payer: MEDICARE

## 2022-10-06 VITALS
HEART RATE: 78 BPM | DIASTOLIC BLOOD PRESSURE: 74 MMHG | SYSTOLIC BLOOD PRESSURE: 102 MMHG | BODY MASS INDEX: 19.94 KG/M2 | HEIGHT: 71 IN

## 2022-10-06 DIAGNOSIS — M54.42 CHRONIC BILATERAL LOW BACK PAIN WITH BILATERAL SCIATICA: Primary | ICD-10-CM

## 2022-10-06 DIAGNOSIS — M17.11 PRIMARY OSTEOARTHRITIS OF RIGHT KNEE: ICD-10-CM

## 2022-10-06 DIAGNOSIS — M54.41 CHRONIC BILATERAL LOW BACK PAIN WITH BILATERAL SCIATICA: Primary | ICD-10-CM

## 2022-10-06 DIAGNOSIS — G89.29 CHRONIC BILATERAL LOW BACK PAIN WITH BILATERAL SCIATICA: Primary | ICD-10-CM

## 2022-10-06 DIAGNOSIS — R29.898 WEAKNESS OF BOTH LOWER EXTREMITIES: ICD-10-CM

## 2022-10-06 PROCEDURE — 1160F RVW MEDS BY RX/DR IN RCRD: CPT | Mod: CPTII,S$GLB,, | Performed by: ORTHOPAEDIC SURGERY

## 2022-10-06 PROCEDURE — 3008F PR BODY MASS INDEX (BMI) DOCUMENTED: ICD-10-PCS | Mod: CPTII,S$GLB,, | Performed by: ORTHOPAEDIC SURGERY

## 2022-10-06 PROCEDURE — 3078F PR MOST RECENT DIASTOLIC BLOOD PRESSURE < 80 MM HG: ICD-10-PCS | Mod: CPTII,S$GLB,, | Performed by: ORTHOPAEDIC SURGERY

## 2022-10-06 PROCEDURE — 99213 OFFICE O/P EST LOW 20 MIN: CPT | Mod: S$GLB,,, | Performed by: ORTHOPAEDIC SURGERY

## 2022-10-06 PROCEDURE — 1159F MED LIST DOCD IN RCRD: CPT | Mod: CPTII,S$GLB,, | Performed by: ORTHOPAEDIC SURGERY

## 2022-10-06 PROCEDURE — 99999 PR PBB SHADOW E&M-EST. PATIENT-LVL III: CPT | Mod: PBBFAC,,, | Performed by: ORTHOPAEDIC SURGERY

## 2022-10-06 PROCEDURE — 99213 PR OFFICE/OUTPT VISIT, EST, LEVL III, 20-29 MIN: ICD-10-PCS | Mod: S$GLB,,, | Performed by: ORTHOPAEDIC SURGERY

## 2022-10-06 PROCEDURE — 3078F DIAST BP <80 MM HG: CPT | Mod: CPTII,S$GLB,, | Performed by: ORTHOPAEDIC SURGERY

## 2022-10-06 PROCEDURE — 1159F PR MEDICATION LIST DOCUMENTED IN MEDICAL RECORD: ICD-10-PCS | Mod: CPTII,S$GLB,, | Performed by: ORTHOPAEDIC SURGERY

## 2022-10-06 PROCEDURE — 3074F PR MOST RECENT SYSTOLIC BLOOD PRESSURE < 130 MM HG: ICD-10-PCS | Mod: CPTII,S$GLB,, | Performed by: ORTHOPAEDIC SURGERY

## 2022-10-06 PROCEDURE — 3008F BODY MASS INDEX DOCD: CPT | Mod: CPTII,S$GLB,, | Performed by: ORTHOPAEDIC SURGERY

## 2022-10-06 PROCEDURE — 99999 PR PBB SHADOW E&M-EST. PATIENT-LVL III: ICD-10-PCS | Mod: PBBFAC,,, | Performed by: ORTHOPAEDIC SURGERY

## 2022-10-06 PROCEDURE — 1125F PR PAIN SEVERITY QUANTIFIED, PAIN PRESENT: ICD-10-PCS | Mod: CPTII,S$GLB,, | Performed by: ORTHOPAEDIC SURGERY

## 2022-10-06 PROCEDURE — 3074F SYST BP LT 130 MM HG: CPT | Mod: CPTII,S$GLB,, | Performed by: ORTHOPAEDIC SURGERY

## 2022-10-06 PROCEDURE — 1125F AMNT PAIN NOTED PAIN PRSNT: CPT | Mod: CPTII,S$GLB,, | Performed by: ORTHOPAEDIC SURGERY

## 2022-10-06 PROCEDURE — 1160F PR REVIEW ALL MEDS BY PRESCRIBER/CLIN PHARMACIST DOCUMENTED: ICD-10-PCS | Mod: CPTII,S$GLB,, | Performed by: ORTHOPAEDIC SURGERY

## 2022-10-06 NOTE — PROGRESS NOTES
Subjective:          Chief Complaint: Chuy Stevens is a 67 y.o. male who had concerns including Pain of the Left Knee and Pain of the Right Knee.    HPI    Patient presents today for follow-up right knee pain. Patient states his knee pain and swelling have decreased some, however his main complaint is pain beginning along the posterior aspects of his knees and radiating all the way up the back of his legs to his back.  He states pain is always present, however made worse with certain activities to include trying to find a comfortable sleeping position as well as bending down to tie his shoes.    Interval history 09/01/2022: Patient presents to clinic with acute right knee pain x 10 days. Patient states the pain began gradually and is localized along the medial aspect of his right knee. He denies any [facility] or traumatic event.  Pain is made worse when squatting up and down and going up and down stairs.  He rates the pain as 1/10 and made worse with the activities described above.  He has attempted multiple conservative measures that include activity modification, ice & elevation, and oral medications (Tylenol), with mild relief. He denies any mechanical symptoms to include locking and catching, but does admit to some subjective instability.  Is affecting ADLs and limiting desired level of activity. Denies numbness, tingling, radiation, and inability to bear weight. He is here today to discuss treatment options.    No previous surgeries or trauma on right knee    Additionally, he reports moderate swelling in his right ankle. He reports no pain, limitations, limited ROM, or any symptoms other than the swelling, for which she 1st noticed today.        Review of Systems   Constitutional: Negative.   HENT: Negative.     Eyes: Negative.    Cardiovascular: Negative.    Respiratory: Negative.     Endocrine: Negative.    Hematologic/Lymphatic: Negative.    Skin: Negative.    Musculoskeletal:  Positive for arthritis,  joint pain, joint swelling and stiffness. Negative for falls and muscle weakness.   Neurological: Negative.    Psychiatric/Behavioral: Negative.     Allergic/Immunologic: Negative.      Pain Related Questions  Over the past 3 days, what was your average pain during activity? (I.e. running, jogging, walking, climbing stairs, getting dressed, ect.): 8  Over the past 3 days, what was your highest pain level?: 8  Over the past 3 days, what was your lowest pain level? : 5    Other  Was the patient's HEIGHT measured or patient reported?: Patient Reported  Was the patient's WEIGHT measured or patient reported?: Measured      Objective:        General: Chuy is well-developed, well-nourished, appears stated age, in no acute distress, alert and oriented to time, place and person.     General    Nursing note and vitals reviewed.  Constitutional: He is oriented to person, place, and time. He appears well-developed and well-nourished. No distress.   HENT:   Head: Normocephalic and atraumatic.   Nose: Nose normal.   Eyes: EOM are normal.   Cardiovascular:  Intact distal pulses.            Pulmonary/Chest: Effort normal. No respiratory distress.   Neurological: He is alert and oriented to person, place, and time.   Psychiatric: He has a normal mood and affect. His behavior is normal. Judgment and thought content normal.         Right Ankle/Foot Exam     Inspection   Scars: absent  Deformity: absent  Erythema: absent  Bruising: Ankle - absent Foot - absent  Effusion: Ankle - present Foot - absent  Atrophy: Ankle - absent Foot - absent    Swelling   The patient is swollen on the lateral talar dome and medial talar dome.    Range of Motion   Ankle Joint   Dorsiflexion:  20   Plantar flexion:  40   Subtalar Joint   Inversion:  20   Eversion:  10     Muscle Strength   The patient has normal right ankle strength.    Other   Sensation: normal    Comments:  Patient has full strength in ROM of right ankle.  Patient was non tender to  palpation.      Right Knee Exam     Inspection   Erythema: absent  Scars: absent  Swelling: absent  Effusion: present  Deformity: absent  Bruising: absent    Tenderness   The patient is tender to palpation of the medial joint line and patella.    Range of Motion   Extension:  -5   Flexion:  110     Tests   Meniscus   Padmini:  Medial - positive Lateral - negative  Ligament Examination   Lachman: normal (-1 to 2mm)   PCL-Posterior Drawer: normal (0 to 2mm)     MCL - Valgus: normal (0 to 2mm)  LCL - Varus: normal  Patella   Patellar apprehension: negative  Passive Patellar Tilt: neutral  Patellar Tracking: normal  Patellar Grind: positive    Other   Muscle Tightness: hamstring tightness  Sensation: normal    Left Knee Exam     Inspection   Erythema: absent  Scars: absent  Swelling: absent  Effusion: absent  Deformity: absent  Bruising: absent    Tenderness   The patient is experiencing no tenderness.     Range of Motion   Extension:  -5   Flexion:  140     Tests   Meniscus   Padmini:  Medial - negative Lateral - negative  Stability   Lachman: normal (-1 to 2mm)   PCL-Posterior Drawer: normal (0 to 2mm)  MCL - Valgus: normal (0 to 2mm)  LCL - Varus: normal (0 to 2mm)  Patella   Patellar apprehension: negative  Passive Patellar Tilt: neutral  Patellar Tracking: normal  Patellar Grind: negative    Other   Muscle Tightness: hamstring tightness  Sensation: normal    Muscle Strength   Right Lower Extremity   Quadriceps:  5/5   Hamstrin/5   Left Lower Extremity   Quadriceps:  5/5   Hamstrin/5     Vascular Exam     Right Pulses  Dorsalis Pedis:      2+  Posterior Tibial:      2+        Left Pulses  Dorsalis Pedis:      2+  Posterior Tibial:      2+        Edema  Right Lower Leg: present  Left Lower Leg: absent    Previous Radiographs right knee     My interpretation:     Significant joint space narrowing of the medial compartment with chondrocalcinosis visualized      Previous Radiographs right ankle     My  interpretation:    Moderate joint effusion    No other signs of fracture, contusion, swelling, DJD, or any other bony abnormalities noted.          Assessment:       Encounter Diagnoses   Name Primary?    Acute pain of right knee Yes    Primary osteoarthritis of right knee             Plan:           1. We again discussed a variety of treatment options including medications, injections, physical therapy and other alternative treatments. I also explained the indications, risks and benefits of surgery. Patient's pain is refractory HEP, conservative management, and NSAIDs. Had a lengthy discussion about osteoarthritis in the knees to include the primary causes to include excessive weight, trauma, genetics, and muscle weakness as well as the different forms of treatment used to help alleviate symptoms including CSI, physical therapy, and Visco supplementation injections.  Because of the location of patient's pain and weakness, I recommended formal physical therapy at this time, for which she agreed to.    We also discussed the chondrocalcinosis that was visualized on his x-ray which can occasionally be accompanied with pseudo gout.  There is still no effusion in the right knee, so I am not optimistic a repeat aspiration would be successful.    2. Ice compress to the affected area 2-3x a day for 15-20 minutes as needed for pain management.  Ibuprofen prn pain.    3. Ambulatory referral for formal physical therapy at [facility] with focus on back and hamstring weakness.    4. RTC to see Tc Perez PA-C in 7 weeks for f/u.  Will reassess pain and determine if patient could benefit from possible repeat CSI of right knee.        All of the patient's questions were answered. Patient was advised to call the clinic or contact me through the patient portal for any questions or concerns.       Medical Dictation software was used during the dictation of portions or the entirety of this medical record.  Phonetic or  grammatic errors may exist due to the use of this software. For clarification, refer to the author of the document.                    Patient questionnaires may have been collected.

## 2022-10-19 ENCOUNTER — CLINICAL SUPPORT (OUTPATIENT)
Dept: REHABILITATION | Facility: HOSPITAL | Age: 67
End: 2022-10-19
Payer: MEDICARE

## 2022-10-19 DIAGNOSIS — M54.42 CHRONIC BILATERAL LOW BACK PAIN WITH BILATERAL SCIATICA: ICD-10-CM

## 2022-10-19 DIAGNOSIS — M54.41 CHRONIC BILATERAL LOW BACK PAIN WITH BILATERAL SCIATICA: ICD-10-CM

## 2022-10-19 DIAGNOSIS — R26.89 DECREASED FUNCTIONAL MOBILITY: ICD-10-CM

## 2022-10-19 DIAGNOSIS — M17.11 PRIMARY OSTEOARTHRITIS OF RIGHT KNEE: ICD-10-CM

## 2022-10-19 DIAGNOSIS — R29.898 DECREASED STRENGTH OF LOWER EXTREMITY: ICD-10-CM

## 2022-10-19 DIAGNOSIS — G89.29 CHRONIC BILATERAL LOW BACK PAIN WITH BILATERAL SCIATICA: ICD-10-CM

## 2022-10-19 DIAGNOSIS — R29.898 WEAKNESS OF BOTH LOWER EXTREMITIES: ICD-10-CM

## 2022-10-19 PROCEDURE — 97110 THERAPEUTIC EXERCISES: CPT

## 2022-10-19 PROCEDURE — 97161 PT EVAL LOW COMPLEX 20 MIN: CPT

## 2022-10-19 NOTE — PLAN OF CARE
"[facility]  Physical Therapy Initial Evaluation    Date: 10/19/2022   Name: Chuy Stevens  Clinic Number: [id number]    Therapy Diagnosis:   Encounter Diagnoses   Name Primary?    Chronic bilateral low back pain with bilateral sciatica     Primary osteoarthritis of right knee     Weakness of both lower extremities     Decreased strength of lower extremity     Decreased functional mobility      Physician: Jimi Perez*    Physician Orders: PT Eval and Treat   Medical Diagnosis from Referral:   Diagnosis   M54.42,M54.41,G89.29 (ICD-10-CM) - Chronic bilateral low back pain with bilateral sciatica   M17.11 (ICD-10-CM) - Primary osteoarthritis of right knee   R29.898 (ICD-10-CM) - Weakness of both lower extremities     Evaluation Date: 10/19/2022  Authorization Period Expiration: 12/31/2022  Plan of Care Expiration: 1/11/2023  Visit # / Visits authorized: 1/ 1    Time In: 100pm  Time Out: 200pm  Total Appointment Time (timed & untimed codes): 60 minutes    Precautions: Standard    Subjective   Date of onset: 6 weeks ago  History of current condition - Chuy presents to physical therapy with symptoms of pain that feels like muscle pain generally from hips down to his knees across the entire anterior portion of both legs. He says he just woke up on day with this pain, reports no Mechanism of injury or change in activity. He also started having Bilateral arm pain from his shoulders down to his elbows a few weeks ago that is the same type of pain. This pain causes him to have difficulty lifting his arms overhead. His Bilateral leg pain causes him to have difficulty bending over, putting on his shoes, and getting up from his chair. He says if he sits long enough he can "forget about it" but when he tries to stand it comes back quickly and causes him to have trouble getting up.   He denies any numbness and tingling in his Lower Extremities. He denies and saddle anesthesia. He " denies any recent bowel or bladder changes.      Medical History:   Past Medical History:   Diagnosis Date    Hypertension        Surgical History:   Chuy Stevens  has no past surgical history on file.    Medications:   Chuy has a current medication list which includes the following prescription(s): albuterol, albuterol, amlodipine, atrovent hfa, and trelegy ellipta.    Allergies:   Review of patient's allergies indicates:  No Known Allergies     Imaging, X-Ray: ankle and knee  Right ankle: (9/1/2022) Mild soft tissue swelling about the ankle.Oblique lucent line seen in lateral malleolus on frontal view which could reflect non-displaced fracture or nutrient channel.  Correlate for history of trauma.  Bilateral knees: (9/1/2022)    Right: Severe medial knee compartment joint space narrowing.  No large geodes or sclerosis.  Minimal tricompartment osteophyte formation.  Meniscal and cartilage calcifications seen which can be seen with CPPD.  The femoral patellar joint space is preserved.  There is a small joint effusion.  Vascular calcifications seen.  No fracture, no osseous lesions.   Left: Moderate symmetrical knee joint space narrowing.  Meniscal and cartilage calcifications seen.  The left femoral patellar joint space is preserved.  No large osteophyte, no significant sclerosis or geodes.  Vascular calcifications seen.    Prior Therapy: -  Social History: -   Occupation: retired mostly, works 2-3 days a week as a   Prior Level of Function: able to get on and off the ground with no issues  Current Level of Function: unable to get on the floor without pain, significant increase in time and effort     Pain:  Current 4/10, worst 6/10, best 3/10 VAS  Location: bilateral anterior legs and bilateral arms  Description: Aching  Aggravating Factors: Bending, Lifting, and Getting out of bed/chair  Easing Factors: lying down    Patients goals: be pain free, be able to perform Activities of daily living  without difficulty    Objective   Gait:   No assistive device, decreased step length    Observation:   General muscle atrophy, ectomorphic      Range of Motion:   Knee Left active Left Passive Right Active R passive   Flexion 120 130 with pain 120 130 with pain   Extension 0 5 0 5     Active Range of Motion of lumbar spine: limited by 25% in all directions without provoking symptoms  Hip range of motion: limited Internal Rotation (10) and External Rotation (15) Bilateral  Ankle range of motion within normal limits    Joint Mobility:  Patella hypomobile in all directions    Lower Extremity Strength    Quad Set: Fair  SLR: Fair    Right LE  Left LE    Knee extension: 4-/5 Knee extension: 4-/5   Knee flexion: 4-/5 Knee flexion: 4-/5   Hip flexion: 4-/5 Hip flexion: 4-/5   Hip abduction: 3+/5 Hip abduction: 3+/5   PGM: 3/5 PGM:  3/5   Ankle dorsiflexion: 4/5 Ankle dorsiflexion: 4/5   Ankle plantarflexion: 3+/5 Ankle plantarflexion: 3+/5       Special Tests:  Varus/valgus/anterior drawer/Lachman's tests all negative bilaterally.  [facility]'s positive on the right for medial meniscus  Pain with end range flexion Bilateral   No pain with hyperextension Bilateral   No clicking or popping reported    Palpation:  Lateral joint line of the right knee tender to palpation    Sensation: intact in Lower Extremities, equal bilaterally    Neurological testing:  Babinski (-)  Clonus (-)  Villa's (-)    Deep tendon reflexes:  Patellar 2+ bilaterally  Spike's 0 bilaterally    Myotomes:  Weakness throughout Lower Extremities and equal side to side    Dermatomes:  Intact sensation throughout Lower Extremities and equal side to side    Limitation/Restriction for FOTO Knee Survey    Therapist reviewed FOTO scores for Chuy Stevens on 10/19/2022.   FOTO documents entered into [employer] - see Media section.    Limitation Score:   Predcited Limitation Score:     Patient unable to work ipad for survey         TREATMENT   Treatment Time  In: 145pm  Treatment Time Out: 200pm  Total Treatment time (time-based codes) separate from Evaluation: 15 minutes    Chuy received therapeutic exercises to develop strength, endurance, ROM, and flexibility for 15 minutes including:    Bridges 2 x 15  Bridges with hamstring bias 2 x 15  Straight leg raise supine 2 x 15 Bilateral        Home Exercises and Patient Education Provided    Education provided:   - Home Exercise Program   - Plan of care /prognosis    Written Home Exercises Provided: yes.  Exercises were reviewed and Chuy was able to demonstrate them prior to the end of the session.  Chuy demonstrated good  understanding of the education provided.     See EMR under Patient Instructions for exercises provided 10/19/2022.    Assessment   Chuy is a 67 y.o. male referred to outpatient Physical Therapy with a medical diagnosis of chronic bilateral low back pain with Bilateral sciatica; primary OA of right knee; Lower Extremity weakness. Patient presents with signs and symptoms consistent of generalized deconditioning, Lower Extremity weakness, and some meniscus pathology. He has pain during bending over, getting up from a chair, and putting on his shoes. He does not present with any red flags today nor any peripheral or central neurological pathologies. Patient presents with limited trunk Active Range of Motion that is not painful, Hamstring/quad/glute weakness; tenderness of lateral joint line on right knee; and functional limitations of pain and difficulty getting up from a chair as well as up and down from the ground. Patient would benefit from strengthening throughout Lower Extremities, core stabilization and strengthening, hip mobility, and balance and functional training.    Perform TUG and balance assessment at future follow up visits as well as other functional mobility and balance testing.    Patient prognosis is Good.   Patientt will benefit from skilled outpatient Physical Therapy to  address the deficits stated above and in the chart below, provide patient /family education, and to maximize patientt's level of independence.     Plan of care discussed with patient: Yes  Patient's spiritual, cultural and educational needs considered and patient is agreeable to the plan of care and goals as stated below:     Anticipated Barriers for therapy: none    Medical Necessity is demonstrated by the following  History  Co-morbidities and personal factors that may impact the plan of care Co-morbidities:   advanced age    Personal Factors:   age     low   Examination  Body Structures and Functions, activity limitations and participation restrictions that may impact the plan of care Body Regions:   back  lower extremities    Body Systems:    ROM  strength  gross coordinated movement  balance  gait  transfers  transitions  motor control    Participation Restrictions:   none    Activity limitations:   Learning and applying knowledge  no deficits    General Tasks and Commands  no deficits    Communication  no deficits    Mobility  no deficits    Self care  no deficits    Domestic Life  no deficits    Interactions/Relationships  no deficits    Life Areas  no deficits    Community and Social Life  no deficits         low   Clinical Presentation stable and uncomplicated low   Decision Making/ Complexity Score: low     Goals:  Short Term Goals: (6 weeks)  1. Patient will be independent with Home Exercise Program in order to supplement patient in improving functional mobility.   2. Patient will improve Bilateral hip Active Range of Motion by 5 degrees to improve functional mobility  3. Patient will improve hip quadriceps strength to 5/5 Bilateral to improve functional mobility   4. Pt will improve hip abduction strength from 3+/5 to 4/5 to improve functional gait deviation.      Long Term Goals: (12 weeks)  1. Pt will be independent with updated Home Exercise Program supplement PT in improving functional mobility.   2.  Patient will improve Bilateral hip Active Range of Motion by 10 degrees to improve functional mobility   3. Patient will improve hip quadriceps strength by whole MMT rating Bilateral to improve functional mobility   4. Patient will improve hip abduction strength from 3+/5 to 4+/5 to improve functional gait deviation.   5. Patient goal: be pain free, be able to perform Activities of daily living without difficulty    Plan   Plan of care Certification: 10/19/2022 to 1/11/2023.    Outpatient Physical Therapy 1-2 times weekly for 12 weeks to include the following interventions: Cervical/Lumbar Traction, Gait Training, Manual Therapy, Moist Heat/ Ice, Neuromuscular Re-ed, Patient Education, Therapeutic Activities, and Therapeutic Exercise.     Millie Granado, PT, DPT

## 2022-10-21 NOTE — PROGRESS NOTES
"  Physical Therapy Daily Treatment Note     Name: Chuy Logan Bardolph  Clinic Number: [id number]  Therapy Diagnosis:        Encounter Diagnoses   Name Primary?    Chronic bilateral low back pain with bilateral sciatica      Primary osteoarthritis of right knee      Weakness of both lower extremities      Decreased strength of lower extremity      Decreased functional mobility        Physician: Jimi Perez*     Physician Orders: PT Eval and Treat   Medical Diagnosis from Referral:   Diagnosis   M54.42,M54.41,G89.29 (ICD-10-CM) - Chronic bilateral low back pain with bilateral sciatica   M17.11 (ICD-10-CM) - Primary osteoarthritis of right knee   R29.898 (ICD-10-CM) - Weakness of both lower extremities      Evaluation Date: 10/19/2022  Authorization Period Expiration: 12/31/2022  Plan of Care Expiration: 1/11/2023  Visit # / Visits authorized: 2/ 1     Time In: 1305  Time Out: 1400  Total Appointment Time (timed & untimed codes): 55 minutes     Precautions: Standard    Subjective     Pt reports pain in L/S, B quads and R shld. "I woke up like this one day"   He was compliant with home exercise program.  Response to previous treatment:   Functional change:     Pain: 0/10  Location: bilateral back      Objective     Chuy received therapeutic exercises to develop strength, endurance, ROM, flexibility, posture, and core stabilization for  minutes including:  Moist heat for 10' performing SKTC, DKTC SB 30x ea   LTR ball squeeze 3x10  Bridges 2x15/3"  Bridges with hamstring bias 2x15  Stationary bike for 10' for increased ROM, circulation, and mm strength/endurance   SLR B inclined 3x10/3"    B SL hip abd 3x10/3"      Chuy received the following manual therapy techniques: Joint mobilizations, Manual traction, and Soft tissue Mobilization were applied to the:  for 0 minutes, including:      Chuy participated in neuromuscular re-education activities to improve: Balance, Coordination, Sense, Proprioception, " and Posture for 0 minutes. The following activities were included:      Chuy received cold pack for 0 minutes to to decrease circulation, pain, and swelling      Home Exercises Provided and Patient Education Provided     Education provided:   - Compliance with HEP     Written Home Exercises Provided: yes.  Exercises were reviewed and Chuy was able to demonstrate them prior to the end of the session.  Chuy demonstrated good  understanding of the education provided.       Assessment   Pt tolerating tx well. Appropriated mm fatigue with therapy. No increased radicular pain B LE during tx. VC/TC for correcting form/technique with strengthening. Continue to progress hip ROM, LE strengthening     Chuy Is progressing well towards his goals.   Pt prognosis is Good.     Pt will continue to benefit from skilled outpatient physical therapy to address the deficits listed in the problem list box on initial evaluation, provide pt/family education and to maximize pt's level of independence in the home and community environment.     Pt's spiritual, cultural and educational needs considered and pt agreeable to plan of care and goals.    Anticipated barriers to physical therapy: none     Goals: Goals:  Short Term Goals: (6 weeks)  1. Patient will be independent with Home Exercise Program in order to supplement patient in improving functional mobility.   2. Patient will improve Bilateral hip Active Range of Motion by 5 degrees to improve functional mobility  3. Patient will improve hip quadriceps strength to 5/5 Bilateral to improve functional mobility   4. Pt will improve hip abduction strength from 3+/5 to 4/5 to improve functional gait deviation.      Long Term Goals: (12 weeks)  1. Pt will be independent with updated Home Exercise Program supplement PT in improving functional mobility.   2. Patient will improve Bilateral hip Active Range of Motion by 10 degrees to improve functional mobility   3. Patient will improve  hip quadriceps strength by whole MMT rating Bilateral to improve functional mobility   4. Patient will improve hip abduction strength from 3+/5 to 4+/5 to improve functional gait deviation.   5. Patient goal: be pain free, be able to perform Activities of daily living without difficulty    Plan     Continue with POC     Andrew Valentine, PTA, STS

## 2022-10-24 ENCOUNTER — CLINICAL SUPPORT (OUTPATIENT)
Dept: REHABILITATION | Facility: HOSPITAL | Age: 67
End: 2022-10-24
Payer: MEDICARE

## 2022-10-24 DIAGNOSIS — R29.898 DECREASED STRENGTH OF LOWER EXTREMITY: Primary | ICD-10-CM

## 2022-10-24 DIAGNOSIS — R26.89 DECREASED FUNCTIONAL MOBILITY: ICD-10-CM

## 2022-10-24 PROCEDURE — 97110 THERAPEUTIC EXERCISES: CPT | Mod: CQ

## 2022-10-26 ENCOUNTER — CLINICAL SUPPORT (OUTPATIENT)
Dept: REHABILITATION | Facility: HOSPITAL | Age: 67
End: 2022-10-26
Payer: MEDICARE

## 2022-10-26 DIAGNOSIS — R29.898 DECREASED STRENGTH OF LOWER EXTREMITY: Primary | ICD-10-CM

## 2022-10-26 DIAGNOSIS — R26.89 DECREASED FUNCTIONAL MOBILITY: ICD-10-CM

## 2022-10-26 PROCEDURE — 97112 NEUROMUSCULAR REEDUCATION: CPT

## 2022-10-26 PROCEDURE — 97110 THERAPEUTIC EXERCISES: CPT

## 2022-10-26 NOTE — PROGRESS NOTES
"  Physical Therapy Daily Treatment Note     Name: Chuy Logan [place]  Clinic Number: [id number]  Therapy Diagnosis:        Encounter Diagnoses   Name Primary?    Chronic bilateral low back pain with bilateral sciatica      Primary osteoarthritis of right knee      Weakness of both lower extremities      Decreased strength of lower extremity      Decreased functional mobility        Physician: Jimi Perez*     Physician Orders: PT Eval and Treat   Medical Diagnosis from Referral:   Diagnosis   M54.42,M54.41,G89.29 (ICD-10-CM) - Chronic bilateral low back pain with bilateral sciatica   M17.11 (ICD-10-CM) - Primary osteoarthritis of right knee   R29.898 (ICD-10-CM) - Weakness of both lower extremities      Evaluation Date: 10/19/2022  Authorization Period Expiration: 12/31/2022  Plan of Care Expiration: 1/11/2023  Visit # / Visits authorized: 2/ 12     Time In: 100pm  Time Out: 155pm  Total Appointment Time (timed & untimed codes): 55 minutes     Precautions: Standard    Subjective     He felt better for a day or two, but then he felt worse than he did originally. He can't think of anything that correlates with the increase and decrease in symptoms. He reports eating tomatoes, cucumbers, potatoes, macaroni and cheese as general things that he eats. Says he doesn't really eat meat. Then he said he eats a hamburger or hot dog occasionally, about every other day. He does not drink red wine. He drinks a rum and coke or beer maybe twice a week. He denies any history of cancer for himself, but states his father passed away from cancer fairly quickly after he was diagnosed.    He was compliant with home exercise program.  Response to previous treatment:   Functional change:     Pain: 0/10  Location: bilateral back      Objective     Chuy received therapeutic exercises to develop strength, endurance, ROM, flexibility, posture, and core stabilization for 40 minutes including:    Bridges 2x15/3"  Bridges with " "hamstring bias 2x15  Stationary bike for 10' for increased ROM, circulation, and mm strength/endurance, level 3   Straight leg raise, Bilateral inclined 3x10/3"    Bilateral Side Lying hip abd 3x10/3"  Long Arc Quads x 30 Bilateral 5#  Ball squeezes x 30    Not Performed Today:  Moist heat for 10' performing SKTC, DKTC SB 30x ea   LTR ball squeeze 3x10      Chuy received the following manual therapy techniques: Joint mobilizations, Manual traction, and Soft tissue Mobilization were applied to the:  for 0 minutes, including:      Chuy participated in neuromuscular re-education activities to improve: Balance, Coordination, Sense, Proprioception, and Posture for 15 minutes. The following activities were included:    Balance training: feet together and semi tandem, eyes open and eyes closed, increased sway with eyes closed  Lateral step over tall brian, 2 x 30 for dynamic single leg stance      Chuy received cold pack for 0 minutes to to decrease circulation, pain, and swelling      Home Exercises Provided and Patient Education Provided     Education provided:   - Compliance with HEP     Written Home Exercises Provided: yes.  Exercises were reviewed and Chuy was able to demonstrate them prior to the end of the session.  Chuy demonstrated good  understanding of the education provided.       Assessment   Patient instructed to pay attention to how he is feeling after he eats meat and drinks alcohol. Notice any changes in symptoms day of or day after.  Patient tolerating treatment well. Appropriated muscle fatigue with therapy. No increased pain Bilateral Lower Extremity during treatment. Verbal cues/ tactile cues for correcting form/technique with strengthening. Continue to progress hip range of motion, Lower Extremity strengthening.     Chuy Is progressing well towards his goals.   Pt prognosis is Good.     Pt will continue to benefit from skilled outpatient physical therapy to address the deficits " listed in the problem list box on initial evaluation, provide pt/family education and to maximize pt's level of independence in the home and community environment.     Pt's spiritual, cultural and educational needs considered and pt agreeable to plan of care and goals.    Anticipated barriers to physical therapy: none     Goals: Goals:  Short Term Goals: (6 weeks)  1. Patient will be independent with Home Exercise Program in order to supplement patient in improving functional mobility. -Progressing, not met   2. Patient will improve Bilateral hip Active Range of Motion by 5 degrees to improve functional mobility-Progressing, not met   3. Patient will improve hip quadriceps strength to 5/5 Bilateral to improve functional mobility -Progressing, not met   4. Pt will improve hip abduction strength from 3+/5 to 4/5 to improve functional gait deviation. -Progressing, not met      Long Term Goals: (12 weeks)  1. Pt will be independent with updated Home Exercise Program supplement PT in improving functional mobility. -Progressing, not met   2. Patient will improve Bilateral hip Active Range of Motion by 10 degrees to improve functional mobility -Progressing, not met   3. Patient will improve hip quadriceps strength by whole MMT rating Bilateral to improve functional mobility -Progressing, not met   4. Patient will improve hip abduction strength from 3+/5 to 4+/5 to improve functional gait deviation. -Progressing, not met   5. Patient goal: be pain free, be able to perform Activities of daily living without difficulty-Progressing, not met     Plan     Continue with POC     Millie Granado, PT, DPT

## 2022-10-31 ENCOUNTER — CLINICAL SUPPORT (OUTPATIENT)
Dept: REHABILITATION | Facility: HOSPITAL | Age: 67
End: 2022-10-31
Payer: MEDICARE

## 2022-10-31 DIAGNOSIS — R26.89 DECREASED FUNCTIONAL MOBILITY: ICD-10-CM

## 2022-10-31 DIAGNOSIS — R29.898 DECREASED STRENGTH OF LOWER EXTREMITY: Primary | ICD-10-CM

## 2022-10-31 PROCEDURE — 97110 THERAPEUTIC EXERCISES: CPT | Mod: CQ

## 2022-10-31 NOTE — PROGRESS NOTES
"  Physical Therapy Daily Treatment Note     Name: Chuy Logan [place]  Clinic Number: [id number]  Therapy Diagnosis:        Encounter Diagnoses   Name Primary?    Chronic bilateral low back pain with bilateral sciatica      Primary osteoarthritis of right knee      Weakness of both lower extremities      Decreased strength of lower extremity      Decreased functional mobility        Physician: Jimi Perez*     Physician Orders: PT Eval and Treat   Medical Diagnosis from Referral:   Diagnosis   M54.42,M54.41,G89.29 (ICD-10-CM) - Chronic bilateral low back pain with bilateral sciatica   M17.11 (ICD-10-CM) - Primary osteoarthritis of right knee   R29.898 (ICD-10-CM) - Weakness of both lower extremities      Evaluation Date: 10/19/2022  Authorization Period Expiration: 12/31/2022  Plan of Care Expiration: 1/11/2023  Visit # / Visits authorized: 3/ 12     Time In: 1300  Time Out: 1400  Total Appointment Time (timed & untimed codes): 55 minutes     Precautions: Standard    Subjective   Pt reporting chronic lower back pain continues. "Its a constant pain, that sometimes just up".   He was compliant with home exercise program.  Response to previous treatment: no change   Functional change: chronic pain     Pain: 2/10  Location: bilateral back      Objective     Chuy received therapeutic exercises to develop strength, endurance, ROM, flexibility, posture, and core stabilization for 40 minutes including:  Recumbent bike for 10' for increased ROM, circulation, and mm strength/endurance lv 3  Moist heat for 10' performing SKTC, DKTC SB 30x ea   Bridges 2x15/3"  Bridges with hamstring bias 2x15/3"  B SLR inclined 3x10/3"    Bilateral Side Lying hip abd 3x10/3"  Long Arc Quads x 30 Bilateral 5#  Ball squeezes x 30    Not Performed Today:  Moist heat for 10' performing SKTC, DKTC SB 30x ea   LTR ball squeeze 3x10      Chuy received the following manual therapy techniques: Joint mobilizations, Manual traction, and " Soft tissue Mobilization were applied to the:  for 0 minutes, including:      Chuy participated in neuromuscular re-education activities to improve: Balance, Coordination, Sense, Proprioception, and Posture for 15 minutes. The following activities were included:    Balance training: feet together and semi tandem, eyes open and eyes closed, increased sway with eyes closed  Lateral step over tall brian, 2 x 30 for dynamic single leg stance      Chuy received cold pack for 0 minutes to to decrease circulation, pain, and swelling      Home Exercises Provided and Patient Education Provided     Education provided:   - Compliance with HEP     Written Home Exercises Provided: yes.  Exercises were reviewed and Chuy was able to demonstrate them prior to the end of the session.  Chuy demonstrated good  understanding of the education provided.       Assessment   Pt tolerating tx well with Appropriate muscle fatigue after therapy. No increased pain Bilateral Lower Extremity during treatment but B LE soreness Verbal cues/ tactile cues for correcting form/technique with strengthening. Continue to progress hip range of motion, Lower Extremity strengthening.     Chuy Is progressing well towards his goals.   Pt prognosis is Good.     Pt will continue to benefit from skilled outpatient physical therapy to address the deficits listed in the problem list box on initial evaluation, provide pt/family education and to maximize pt's level of independence in the home and community environment.     Pt's spiritual, cultural and educational needs considered and pt agreeable to plan of care and goals.    Anticipated barriers to physical therapy: none     Goals: Goals:  Short Term Goals: (6 weeks)  1. Patient will be independent with Home Exercise Program in order to supplement patient in improving functional mobility. -Progressing, not met   2. Patient will improve Bilateral hip Active Range of Motion by 5 degrees to improve  functional mobility-Progressing, not met   3. Patient will improve hip quadriceps strength to 5/5 Bilateral to improve functional mobility -Progressing, not met   4. Pt will improve hip abduction strength from 3+/5 to 4/5 to improve functional gait deviation. -Progressing, not met      Long Term Goals: (12 weeks)  1. Pt will be independent with updated Home Exercise Program supplement PT in improving functional mobility. -Progressing, not met   2. Patient will improve Bilateral hip Active Range of Motion by 10 degrees to improve functional mobility -Progressing, not met   3. Patient will improve hip quadriceps strength by whole MMT rating Bilateral to improve functional mobility -Progressing, not met   4. Patient will improve hip abduction strength from 3+/5 to 4+/5 to improve functional gait deviation. -Progressing, not met   5. Patient goal: be pain free, be able to perform Activities of daily living without difficulty-Progressing, not met     Plan     Continue with POC     Andrew Valentine, PTA, STS

## 2022-11-02 ENCOUNTER — CLINICAL SUPPORT (OUTPATIENT)
Dept: REHABILITATION | Facility: HOSPITAL | Age: 67
End: 2022-11-02
Payer: MEDICARE

## 2022-11-02 DIAGNOSIS — R26.89 DECREASED FUNCTIONAL MOBILITY: ICD-10-CM

## 2022-11-02 DIAGNOSIS — R29.898 DECREASED STRENGTH OF LOWER EXTREMITY: Primary | ICD-10-CM

## 2022-11-02 PROCEDURE — 97110 THERAPEUTIC EXERCISES: CPT | Mod: CQ

## 2022-11-02 NOTE — PROGRESS NOTES
"  Physical Therapy Daily Treatment Note     Name: Chuy Logan [place]  Clinic Number: [id number]  Therapy Diagnosis:        Encounter Diagnoses   Name Primary?    Chronic bilateral low back pain with bilateral sciatica      Primary osteoarthritis of right knee      Weakness of both lower extremities      Decreased strength of lower extremity      Decreased functional mobility        Physician: Jimi Perez*     Physician Orders: PT Eval and Treat   Medical Diagnosis from Referral:   Diagnosis   M54.42,M54.41,G89.29 (ICD-10-CM) - Chronic bilateral low back pain with bilateral sciatica   M17.11 (ICD-10-CM) - Primary osteoarthritis of right knee   R29.898 (ICD-10-CM) - Weakness of both lower extremities      Evaluation Date: 10/19/2022  Authorization Period Expiration: 12/31/2022  Plan of Care Expiration: 1/11/2023  Visit # / Visits authorized: 3/ 12     Time In: 1300  Time Out: 1400  Total Appointment Time (timed & untimed codes): 55 minutes     Precautions: Standard    Subjective   Pt reporting no pain today in back but stating B quad and hamstring pain with prolonged sitting or lying down.    He was compliant with home exercise program.  Response to previous treatment: decreased pain after therapy  Functional change: chronic pain     Pain: 2/10  Location: B hamstring and quads     Objective     Chuy received therapeutic exercises to develop strength, endurance, ROM, flexibility, posture, and core stabilization for 40 minutes including:  Recumbent bike for 10' for increased ROM, circulation, and mm strength/endurance lv 3  Bridges 2x15/3"  Bridges with hamstring bias 2x15/3"  GTB B clamshell 3x10/3"  Pilate hip abd/add with PT 30x/3" ea   B SLR inclined 3x10/3"    Bilateral Side Lying hip abd 3x10/3"  Long Arc Quads x 30 Bilateral 5#  Ball squeezes x 30    Not Performed Today:  Moist heat for 10' performing SKTC, DKTC SB 30x ea   LTR ball squeeze 3x10      Chuy received the following manual therapy " techniques: Joint mobilizations, Manual traction, and Soft tissue Mobilization were applied to the:  for 0 minutes, including:      Chuy participated in neuromuscular re-education activities to improve: Balance, Coordination, Sense, Proprioception, and Posture for 15 minutes. The following activities were included:    Balance training: feet together and semi tandem, eyes open and eyes closed, increased sway with eyes closed  Lateral step over tall brian, 2 x 30 for dynamic single leg stance      Chuy received cold pack for 0 minutes to to decrease circulation, pain, and swelling      Home Exercises Provided and Patient Education Provided     Education provided:   - Compliance with HEP     Written Home Exercises Provided: yes.  Exercises were reviewed and Chuy was able to demonstrate them prior to the end of the session.  Chuy demonstrated good  understanding of the education provided.       Assessment   Pt tolerating tx well with Appropriate muscle fatigue after therapy. No increased pain Bilateral Lower Extremity during treatment but B LE soreness Verbal cues/ tactile cues for correcting form/technique with strengthening. Continue to progress hip range of motion, Lower Extremity strengthening.     Chuy Is progressing well towards his goals.   Pt prognosis is Good.     Pt will continue to benefit from skilled outpatient physical therapy to address the deficits listed in the problem list box on initial evaluation, provide pt/family education and to maximize pt's level of independence in the home and community environment.     Pt's spiritual, cultural and educational needs considered and pt agreeable to plan of care and goals.    Anticipated barriers to physical therapy: none     Goals: Goals:  Short Term Goals: (6 weeks)  1. Patient will be independent with Home Exercise Program in order to supplement patient in improving functional mobility. -Progressing, not met   2. Patient will improve Bilateral  hip Active Range of Motion by 5 degrees to improve functional mobility-Progressing, not met   3. Patient will improve hip quadriceps strength to 5/5 Bilateral to improve functional mobility -Progressing, not met   4. Pt will improve hip abduction strength from 3+/5 to 4/5 to improve functional gait deviation. -Progressing, not met      Long Term Goals: (12 weeks)  1. Pt will be independent with updated Home Exercise Program supplement PT in improving functional mobility. -Progressing, not met   2. Patient will improve Bilateral hip Active Range of Motion by 10 degrees to improve functional mobility -Progressing, not met   3. Patient will improve hip quadriceps strength by whole MMT rating Bilateral to improve functional mobility -Progressing, not met   4. Patient will improve hip abduction strength from 3+/5 to 4+/5 to improve functional gait deviation. -Progressing, not met   5. Patient goal: be pain free, be able to perform Activities of daily living without difficulty-Progressing, not met     Plan     Continue with POC     Andrew Valentine, PTA, STS

## 2022-11-07 ENCOUNTER — CLINICAL SUPPORT (OUTPATIENT)
Dept: REHABILITATION | Facility: HOSPITAL | Age: 67
End: 2022-11-07
Payer: MEDICARE

## 2022-11-07 DIAGNOSIS — R29.898 DECREASED STRENGTH OF LOWER EXTREMITY: Primary | ICD-10-CM

## 2022-11-07 DIAGNOSIS — R26.89 DECREASED FUNCTIONAL MOBILITY: ICD-10-CM

## 2022-11-07 PROCEDURE — 97110 THERAPEUTIC EXERCISES: CPT

## 2022-11-07 PROCEDURE — 97112 NEUROMUSCULAR REEDUCATION: CPT

## 2022-11-07 NOTE — PROGRESS NOTES
"  Physical Therapy Daily Treatment Note     Name: Chuy Logan [place]  Clinic Number: [id number]  Therapy Diagnosis:        Encounter Diagnoses   Name Primary?    Chronic bilateral low back pain with bilateral sciatica      Primary osteoarthritis of right knee      Weakness of both lower extremities      Decreased strength of lower extremity      Decreased functional mobility        Physician: Jimi Perez*     Physician Orders: PT Eval and Treat   Medical Diagnosis from Referral:   Diagnosis   M54.42,M54.41,G89.29 (ICD-10-CM) - Chronic bilateral low back pain with bilateral sciatica   M17.11 (ICD-10-CM) - Primary osteoarthritis of right knee   R29.898 (ICD-10-CM) - Weakness of both lower extremities      Evaluation Date: 10/19/2022  Authorization Period Expiration: 12/31/2022  Plan of Care Expiration: 1/11/2023  Visit # / Visits authorized: 5/ 12     Time In: 1300  Time Out: 1355  Total Appointment Time (timed & untimed codes): 55 minutes     Precautions: Standard    Subjective   Really hurting today, maybe even worse than when we started. He states he did his Home Exercise Program on Thursday and Saturday, one time each day.    He was compliant with home exercise program.  Response to previous treatment: some soreness  Functional change: chronic pain     Pain: 2/10  Location: B hamstring and quads     Objective     Chuy received therapeutic exercises to develop strength, endurance, ROM, flexibility, posture, and core stabilization for 40 minutes including:    Upright bike for 8' for increased ROM, circulation, and mm strength/endurance lv 3  Bridges 2x15/3"  Pilate hip abd/add with PT 30x/3" ea   B SLR inclined 3x10/3"    Long Arc Quads x 30 Bilateral 5#  Ball squeezes x 30  Double leg heel raises x 30  Standing hip extension x 30 Bilateral     Not Performed Today:  Moist heat for 10' performing SKTC, DKTC SB 30x ea   LTR ball squeeze 3x10  Bridges with hamstring bias 2x15/3"  GTB B clamshell " "3x10/3"  Bilateral Side Lying hip abd 3x10/3"      Chuy received the following manual therapy techniques: Joint mobilizations, Manual traction, and Soft tissue Mobilization were applied to the:  for 0 minutes, including:      Chuy participated in neuromuscular re-education activities to improve: Balance, Coordination, Sense, Proprioception, and Posture for 15 minutes. The following activities were included:    Balance training: feet together and semi tandem, eyes open and eyes closed, increased sway with eyes closed  Lateral step over tall brian, 2 x 30 for dynamic single leg stance      Chuy received cold pack for 0 minutes to to decrease circulation, pain, and swelling      Home Exercises Provided and Patient Education Provided     Education provided:   - Compliance with HEP     Written Home Exercises Provided: yes.  Exercises were reviewed and Chuy was able to demonstrate them prior to the end of the session.  Chuy demonstrated good  understanding of the education provided.       Assessment   Patient more irritable today with increased pain. Pain increases with active knee and hip flexion. Continue to reassess for source of pain and progress strengthening as tolerated.    Chuy Is progressing well towards his goals.   Pt prognosis is Good.     Pt will continue to benefit from skilled outpatient physical therapy to address the deficits listed in the problem list box on initial evaluation, provide pt/family education and to maximize pt's level of independence in the home and community environment.     Pt's spiritual, cultural and educational needs considered and pt agreeable to plan of care and goals.    Anticipated barriers to physical therapy: none     Goals: Goals:  Short Term Goals: (6 weeks)  1. Patient will be independent with Home Exercise Program in order to supplement patient in improving functional mobility. -Progressing, not met   2. Patient will improve Bilateral hip Active Range of " Motion by 5 degrees to improve functional mobility-Progressing, not met   3. Patient will improve hip quadriceps strength to 5/5 Bilateral to improve functional mobility -Progressing, not met   4. Pt will improve hip abduction strength from 3+/5 to 4/5 to improve functional gait deviation. -Progressing, not met      Long Term Goals: (12 weeks)  1. Pt will be independent with updated Home Exercise Program supplement PT in improving functional mobility. -Progressing, not met   2. Patient will improve Bilateral hip Active Range of Motion by 10 degrees to improve functional mobility -Progressing, not met   3. Patient will improve hip quadriceps strength by whole MMT rating Bilateral to improve functional mobility -Progressing, not met   4. Patient will improve hip abduction strength from 3+/5 to 4+/5 to improve functional gait deviation. -Progressing, not met   5. Patient goal: be pain free, be able to perform Activities of daily living without difficulty-Progressing, not met     Plan     Continue with POC     Millie Granado, PT, DPT

## 2022-11-09 ENCOUNTER — CLINICAL SUPPORT (OUTPATIENT)
Dept: REHABILITATION | Facility: HOSPITAL | Age: 67
End: 2022-11-09
Payer: MEDICARE

## 2022-11-09 DIAGNOSIS — R29.898 DECREASED STRENGTH OF LOWER EXTREMITY: Primary | ICD-10-CM

## 2022-11-09 DIAGNOSIS — R26.89 DECREASED FUNCTIONAL MOBILITY: ICD-10-CM

## 2022-11-09 PROCEDURE — 97110 THERAPEUTIC EXERCISES: CPT

## 2022-11-09 PROCEDURE — 97140 MANUAL THERAPY 1/> REGIONS: CPT

## 2022-11-09 NOTE — PROGRESS NOTES
"  Physical Therapy Daily Treatment Note     Name: Chuy Logan [place]  Clinic Number: [id number]  Therapy Diagnosis:        Encounter Diagnoses   Name Primary?    Chronic bilateral low back pain with bilateral sciatica      Primary osteoarthritis of right knee      Weakness of both lower extremities      Decreased strength of lower extremity      Decreased functional mobility        Physician: Jimi Perez*     Physician Orders: PT Eval and Treat   Medical Diagnosis from Referral:   Diagnosis   M54.42,M54.41,G89.29 (ICD-10-CM) - Chronic bilateral low back pain with bilateral sciatica   M17.11 (ICD-10-CM) - Primary osteoarthritis of right knee   R29.898 (ICD-10-CM) - Weakness of both lower extremities      Evaluation Date: 10/19/2022  Authorization Period Expiration: 12/31/2022  Plan of Care Expiration: 1/11/2023  Visit # / Visits authorized: 6/ 12     Time In: 1300  Time Out: 1355  Total Appointment Time (timed & untimed codes): 55 minutes     Precautions: Standard    Subjective   Hurting really bad to day. Worse in the knees. Gets stiff sitting for longer periods of time. No history of RA or psoriitis.Family history of gout.    He was compliant with home exercise program.  Response to previous treatment: some soreness  Functional change: chronic pain     Pain: not formally assessed  Location: B hamstring and quads     Objective     Chuy received therapeutic exercises to develop strength, endurance, ROM, flexibility, posture, and core stabilization for 40 minutes including:    Upright bike for 8' for increased ROM, circulation, and mm strength/endurance level 3  Bridges 2x15/3"  Bridges x 20 with green band around knees 5" hold  Double leg heel raises x 30  Sit to stand 2 x 10 from 64cm height table      Not Performed Today:  Moist heat for 10' performing SKTC, DKTC SB 30x ea   LTR ball squeeze 3x10  Bridges with hamstring bias 2x15/3"  GTB B clamshell 3x10/3"  Bilateral Side Lying hip abd " "3x10/3"  Pilate hip abd/add with PT 30x/3" ea   B SLR inclined 3x10/3"    Long Arc Quads x 30 Bilateral 5#  Ball squeezes x 30  Standing hip extension x 30 Bilateral       Chuy received the following manual therapy techniques: Joint mobilizations, Manual traction, and Soft tissue Mobilization were applied to the:  for 15 minutes, including:    Manual assessment: patient lacking knee extension Bilaterally today with improvements noted after joint mobilizations, pain behind knee with extension, swelling noted      Chuy participated in neuromuscular re-education activities to improve: Balance, Coordination, Sense, Proprioception, and Posture for 0 minutes. The following activities were included:    Balance training: feet together and semi tandem, eyes open and eyes closed, increased sway with eyes closed  Lateral step over tall brian, 2 x 30 for dynamic single leg stance      Chuy received cold pack for 0 minutes to to decrease circulation, pain, and swelling      Home Exercises Provided and Patient Education Provided     Education provided:   - Compliance with HEP     Written Home Exercises Provided: yes.  Exercises were reviewed and Chuy was able to demonstrate them prior to the end of the session.  Chuy demonstrated good  understanding of the education provided.       Assessment   Patient more irritable today with increased pain. Pain increases with active knee and hip flexion. Continue to reassess for source of pain and progress strengthening as tolerated. Focus on general movement, strength and conditioning.    Chuy Is progressing well towards his goals.   Pt prognosis is Good.     Pt will continue to benefit from skilled outpatient physical therapy to address the deficits listed in the problem list box on initial evaluation, provide pt/family education and to maximize pt's level of independence in the home and community environment.     Pt's spiritual, cultural and educational needs considered " and pt agreeable to plan of care and goals.    Anticipated barriers to physical therapy: none     Goals: Goals:  Short Term Goals: (6 weeks)  1. Patient will be independent with Home Exercise Program in order to supplement patient in improving functional mobility. -Progressing, not met   2. Patient will improve Bilateral hip Active Range of Motion by 5 degrees to improve functional mobility-Progressing, not met   3. Patient will improve hip quadriceps strength to 5/5 Bilateral to improve functional mobility -Progressing, not met   4. Pt will improve hip abduction strength from 3+/5 to 4/5 to improve functional gait deviation. -Progressing, not met      Long Term Goals: (12 weeks)  1. Pt will be independent with updated Home Exercise Program supplement PT in improving functional mobility. -Progressing, not met   2. Patient will improve Bilateral hip Active Range of Motion by 10 degrees to improve functional mobility -Progressing, not met   3. Patient will improve hip quadriceps strength by whole MMT rating Bilateral to improve functional mobility -Progressing, not met   4. Patient will improve hip abduction strength from 3+/5 to 4+/5 to improve functional gait deviation. -Progressing, not met   5. Patient goal: be pain free, be able to perform Activities of daily living without difficulty-Progressing, not met     Plan     Continue with POC     Millie Granado, PT, DPT

## 2022-11-14 ENCOUNTER — CLINICAL SUPPORT (OUTPATIENT)
Dept: REHABILITATION | Facility: HOSPITAL | Age: 67
End: 2022-11-14
Payer: MEDICARE

## 2022-11-14 DIAGNOSIS — R26.89 DECREASED FUNCTIONAL MOBILITY: ICD-10-CM

## 2022-11-14 DIAGNOSIS — R29.898 DECREASED STRENGTH OF LOWER EXTREMITY: Primary | ICD-10-CM

## 2022-11-14 PROCEDURE — 97110 THERAPEUTIC EXERCISES: CPT | Mod: CQ

## 2022-11-14 NOTE — PROGRESS NOTES
"  Physical Therapy Daily Treatment Note     Name: Chuy Logan Taloga  Clinic Number: [id number]  Therapy Diagnosis:        Encounter Diagnoses   Name Primary?    Chronic bilateral low back pain with bilateral sciatica      Primary osteoarthritis of right knee      Weakness of both lower extremities      Decreased strength of lower extremity      Decreased functional mobility        Physician: Jimi Perez*     Physician Orders: PT Eval and Treat   Medical Diagnosis from Referral:   Diagnosis   M54.42,M54.41,G89.29 (ICD-10-CM) - Chronic bilateral low back pain with bilateral sciatica   M17.11 (ICD-10-CM) - Primary osteoarthritis of right knee   R29.898 (ICD-10-CM) - Weakness of both lower extremities      Evaluation Date: 10/19/2022  Authorization Period Expiration: 12/31/2022  Plan of Care Expiration: 1/11/2023  Visit # / Visits authorized: 6/ 12     Time In: 1300  Time Out: 1400  Total Appointment Time (timed & untimed codes): 60 minutes     Precautions: Standard    Subjective   Mod pain with B LE today made worse by cold weather.   He was compliant with home exercise program.  Response to previous treatment: some soreness  Functional change: chronic pain     Pain: no number stated.   Location: B hamstring and quads     Objective     Chuy received therapeutic exercises to develop strength, endurance, ROM, flexibility, posture, and core stabilization for 60minutes including:    Upright bike for 10' for increased ROM, circulation, and mm strength/endurance level 3  Bridges 2x15/3"  Bridges GTB hip abd 2x15/3"  B clamshells 2x15/3"  B SLR 2x15 ea/3'  B Hip abd 2x15   Double leg heel raises x 30  Sit to stand 2 x 10 from 64cm height table NT      Not Performed Today:  Moist heat for 10' performing SKTC, DKTC SB 30x ea   LTR ball squeeze 3x10  Bridges with hamstring bias 2x15/3"  GTB B clamshell 3x10/3"  Bilateral Side Lying hip abd 3x10/3"  Pilate hip abd/add with PT 30x/3" ea   B SLR inclined 3x10/3"  "   Long Arc Quads x 30 Bilateral 5#  Ball squeezes x 30  Standing hip extension x 30 Bilateral       Chuy received the following manual therapy techniques: Joint mobilizations, Manual traction, and Soft tissue Mobilization were applied to the:  for 0 minutes, including:    Manual assessment: patient lacking knee extension Bilaterally today with improvements noted after joint mobilizations, pain behind knee with extension, swelling noted      Chuy participated in neuromuscular re-education activities to improve: Balance, Coordination, Sense, Proprioception, and Posture for 0 minutes. The following activities were included:    Balance training: feet together and semi tandem, eyes open and eyes closed, increased sway with eyes closed  Lateral step over tall brian, 2 x 30 for dynamic single leg stance nt      Chuy received cold pack for 0 minutes to to decrease circulation, pain, and swelling      Home Exercises Provided and Patient Education Provided     Education provided:   - Compliance with HEP     Written Home Exercises Provided: yes.  Exercises were reviewed and Chuy was able to demonstrate them prior to the end of the session.  Chuy demonstrated good  understanding of the education provided.       Assessment   Pt tolerating tx well with min increased pain knee and hip flexion, but noted decreased stiffness after tx. Continued with Focus on general movement, strength and conditioning per Supervising PT.     Chuy Is progressing well towards his goals.   Pt prognosis is Good.     Pt will continue to benefit from skilled outpatient physical therapy to address the deficits listed in the problem list box on initial evaluation, provide pt/family education and to maximize pt's level of independence in the home and community environment.     Pt's spiritual, cultural and educational needs considered and pt agreeable to plan of care and goals.    Anticipated barriers to physical therapy: none     Goals:  Goals:  Short Term Goals: (6 weeks)  1. Patient will be independent with Home Exercise Program in order to supplement patient in improving functional mobility. -Progressing, not met   2. Patient will improve Bilateral hip Active Range of Motion by 5 degrees to improve functional mobility-Progressing, not met   3. Patient will improve hip quadriceps strength to 5/5 Bilateral to improve functional mobility -Progressing, not met   4. Pt will improve hip abduction strength from 3+/5 to 4/5 to improve functional gait deviation. -Progressing, not met      Long Term Goals: (12 weeks)  1. Pt will be independent with updated Home Exercise Program supplement PT in improving functional mobility. -Progressing, not met   2. Patient will improve Bilateral hip Active Range of Motion by 10 degrees to improve functional mobility -Progressing, not met   3. Patient will improve hip quadriceps strength by whole MMT rating Bilateral to improve functional mobility -Progressing, not met   4. Patient will improve hip abduction strength from 3+/5 to 4+/5 to improve functional gait deviation. -Progressing, not met   5. Patient goal: be pain free, be able to perform Activities of daily living without difficulty-Progressing, not met     Plan     Continue with POC     Andrew Valentine, PTA, STS

## 2022-11-16 ENCOUNTER — CLINICAL SUPPORT (OUTPATIENT)
Dept: REHABILITATION | Facility: HOSPITAL | Age: 67
End: 2022-11-16
Payer: MEDICARE

## 2022-11-16 DIAGNOSIS — R26.89 DECREASED FUNCTIONAL MOBILITY: ICD-10-CM

## 2022-11-16 DIAGNOSIS — R29.898 DECREASED STRENGTH OF LOWER EXTREMITY: Primary | ICD-10-CM

## 2022-11-16 PROCEDURE — 97110 THERAPEUTIC EXERCISES: CPT

## 2022-11-16 NOTE — PROGRESS NOTES
"  Physical Therapy Daily Treatment Note     Name: Chuy Logan Congers  Clinic Number: [id number]  Therapy Diagnosis:        Encounter Diagnoses   Name Primary?    Chronic bilateral low back pain with bilateral sciatica      Primary osteoarthritis of right knee      Weakness of both lower extremities      Decreased strength of lower extremity      Decreased functional mobility        Physician: Jimi Perez*     Physician Orders: PT Eval and Treat   Medical Diagnosis from Referral:   Diagnosis   M54.42,M54.41,G89.29 (ICD-10-CM) - Chronic bilateral low back pain with bilateral sciatica   M17.11 (ICD-10-CM) - Primary osteoarthritis of right knee   R29.898 (ICD-10-CM) - Weakness of both lower extremities      Evaluation Date: 10/19/2022  Authorization Period Expiration: 12/31/2022  Plan of Care Expiration: 1/11/2023  Visit # / Visits authorized: 6/ 12     Time In: 1300  Time Out: 1400  Total Appointment Time (timed & untimed codes): 60 minutes     Precautions: Standard    Subjective   Feeling good today. Has been doing his exercises and riding his bike. He did not do the sit to stands because he felt unsteady and was afraid of falling.  He was compliant with home exercise program.  Response to previous treatment: some soreness  Functional change: chronic pain     Pain: no number stated.   Location: B hamstring and quads     Objective     Chuy received therapeutic exercises to develop strength, endurance, ROM, flexibility, posture, and core stabilization for 55 minutes including:    Elliptical 3:30, patient fatigued  Bridges 2x15/3"  Bridges with Hamstring bias 2 x 15/3"  B clamshells 2x15/3" green thera band  B SLR 2x15 ea/3' 1.5# ankle weight  Double leg heel raises x 30  Sit to stand 2 x 10 from 60cm height table  Banded side steps, green thera band 20 laps along the table      Not Performed Today:  Moist heat for 10' performing SKTC, DKTC SB 30x ea   LTR ball squeeze 3x10  GTB B clamshell " "3x10/3"  Bilateral Side Lying hip abd 3x10/3"  Pilate hip abd/add with PT 30x/3" ea   B SLR inclined 3x10/3"    Long Arc Quads x 30 Bilateral 5#  Ball squeezes x 30  Standing hip extension x 30 Bilateral   Bridges GTB hip abd 2x15/3"  B Hip abd 2x15   Upright bike for 10' for increased ROM, circulation, and mm strength/endurance level 3      Chuy received the following manual therapy techniques: Joint mobilizations, Manual traction, and Soft tissue Mobilization were applied to the:  for 0 minutes, including:    Manual assessment: patient lacking knee extension Bilaterally today with improvements noted after joint mobilizations, pain behind knee with extension, swelling noted      Chuy participated in neuromuscular re-education activities to improve: Balance, Coordination, Sense, Proprioception, and Posture for 0 minutes. The following activities were included:    Balance training: feet together and semi tandem, eyes open and eyes closed, increased sway with eyes closed  Lateral step over tall brian, 2 x 30 for dynamic single leg stance nt      Chuy received cold pack for 0 minutes to to decrease circulation, pain, and swelling      Home Exercises Provided and Patient Education Provided     Education provided:   - Compliance with HEP     Written Home Exercises Provided: yes.  Exercises were reviewed and Chuy was able to demonstrate them prior to the end of the session.  Chuy demonstrated good  understanding of the education provided.       Assessment   Pt tolerating tx well with fatigue at end of session. Continue to Focus on general movement, strength and conditioning.     Chuy Is progressing well towards his goals.   Pt prognosis is Good.     Pt will continue to benefit from skilled outpatient physical therapy to address the deficits listed in the problem list box on initial evaluation, provide pt/family education and to maximize pt's level of independence in the home and community environment. "     Pt's spiritual, cultural and educational needs considered and pt agreeable to plan of care and goals.    Anticipated barriers to physical therapy: none     Goals: Goals:  Short Term Goals: (6 weeks)  1. Patient will be independent with Home Exercise Program in order to supplement patient in improving functional mobility. -Progressing, not met   2. Patient will improve Bilateral hip Active Range of Motion by 5 degrees to improve functional mobility-Progressing, not met   3. Patient will improve hip quadriceps strength to 5/5 Bilateral to improve functional mobility -Progressing, not met   4. Pt will improve hip abduction strength from 3+/5 to 4/5 to improve functional gait deviation. -Progressing, not met      Long Term Goals: (12 weeks)  1. Pt will be independent with updated Home Exercise Program supplement PT in improving functional mobility. -Progressing, not met   2. Patient will improve Bilateral hip Active Range of Motion by 10 degrees to improve functional mobility -Progressing, not met   3. Patient will improve hip quadriceps strength by whole MMT rating Bilateral to improve functional mobility -Progressing, not met   4. Patient will improve hip abduction strength from 3+/5 to 4+/5 to improve functional gait deviation. -Progressing, not met   5. Patient goal: be pain free, be able to perform Activities of daily living without difficulty-Progressing, not met     Plan     Continue with POC     Millie Granado, PT, DPT

## 2022-11-21 ENCOUNTER — CLINICAL SUPPORT (OUTPATIENT)
Dept: REHABILITATION | Facility: HOSPITAL | Age: 67
End: 2022-11-21
Payer: MEDICARE

## 2022-11-21 DIAGNOSIS — R29.898 DECREASED STRENGTH OF LOWER EXTREMITY: Primary | ICD-10-CM

## 2022-11-21 DIAGNOSIS — R26.89 DECREASED FUNCTIONAL MOBILITY: ICD-10-CM

## 2022-11-21 PROCEDURE — 97110 THERAPEUTIC EXERCISES: CPT | Mod: CQ

## 2022-11-21 NOTE — PROGRESS NOTES
"  Physical Therapy Daily Treatment Note     Name: Chuy Logan Sanford  Clinic Number: [id number]  Therapy Diagnosis:        Encounter Diagnoses   Name Primary?    Chronic bilateral low back pain with bilateral sciatica      Primary osteoarthritis of right knee      Weakness of both lower extremities      Decreased strength of lower extremity      Decreased functional mobility        Physician: Jimi Perez*     Physician Orders: PT Eval and Treat   Medical Diagnosis from Referral:   Diagnosis   M54.42,M54.41,G89.29 (ICD-10-CM) - Chronic bilateral low back pain with bilateral sciatica   M17.11 (ICD-10-CM) - Primary osteoarthritis of right knee   R29.898 (ICD-10-CM) - Weakness of both lower extremities      Evaluation Date: 10/19/2022  Authorization Period Expiration: 12/31/2022  Plan of Care Expiration: 1/11/2023  Visit # / Visits authorized: 7/ 12     Time In: 1255  Time Out: 1355  Total Appointment Time (timed & untimed codes): 55 minutes     Precautions: Standard    Subjective   I don't like this cold weather. Reporting severe arthritic pain in all his joint.   He was compliant with home exercise program.  Response to previous treatment: some soreness  Functional change: chronic pain     Pain: 10/10  Location: all over today     Objective     Chuy received therapeutic exercises to develop strength, endurance, ROM, flexibility, posture, and core stabilization for 55 minutes including:    Stationary bike for 10' for increased ROM, circulation, and mm strength/endurance  Bridges 2x15/3"  Bridges with Hamstring bias 2 x 15/3"  B clamshells 2x15/3" green thera band  B SLR 2x15 ea/3' 1.5# ankle weight  B Prone hamstring curls 2x15/3" 1#   Double leg heel raises x 30  Sit to stand 2 x 10   Banded side steps, green thera band 20 laps along the table      Not Performed Today:  Moist heat for 10' performing SKTC, DKTC SB 30x ea   LTR ball squeeze 3x10  GTB B clamshell 3x10/3"  Bilateral Side Lying hip abd " "3x10/3"  Pilate hip abd/add with PT 30x/3" ea   B SLR inclined 3x10/3"    Long Arc Quads x 30 Bilateral 5#  Ball squeezes x 30  Standing hip extension x 30 Bilateral   Bridges GTB hip abd 2x15/3"  B Hip abd 2x15       Chuy received the following manual therapy techniques: Joint mobilizations, Manual traction, and Soft tissue Mobilization were applied to the:  for 0 minutes, including:    Manual assessment: patient lacking knee extension Bilaterally today with improvements noted after joint mobilizations, pain behind knee with extension, swelling noted      Chuy participated in neuromuscular re-education activities to improve: Balance, Coordination, Sense, Proprioception, and Posture for 0 minutes. The following activities were included:    Balance training: feet together and semi tandem, eyes open and eyes closed, increased sway with eyes closed  Lateral step over tall brian, 2 x 30 for dynamic single leg stance nt      Chuy received cold pack for 0 minutes to to decrease circulation, pain, and swelling      Home Exercises Provided and Patient Education Provided     Education provided:   - Compliance with HEP     Written Home Exercises Provided: yes.  Exercises were reviewed and Chuy was able to demonstrate them prior to the end of the session.  Chuy demonstrated good  understanding of the education provided.       Assessment   Pt tolerating tx well but mm fatigue requiring min rest and slow progression through therex. Continue to Focus on general movement, strength and conditioning per Supervising PT. VC/TC for correcting form/technique with therex. Mm soreness after tx.     Chuy Is progressing well towards his goals.   Pt prognosis is Good.     Pt will continue to benefit from skilled outpatient physical therapy to address the deficits listed in the problem list box on initial evaluation, provide pt/family education and to maximize pt's level of independence in the home and community " environment.     Pt's spiritual, cultural and educational needs considered and pt agreeable to plan of care and goals.    Anticipated barriers to physical therapy: none     Goals: Goals:  Short Term Goals: (6 weeks)  1. Patient will be independent with Home Exercise Program in order to supplement patient in improving functional mobility. -Progressing, not met   2. Patient will improve Bilateral hip Active Range of Motion by 5 degrees to improve functional mobility-Progressing, not met   3. Patient will improve hip quadriceps strength to 5/5 Bilateral to improve functional mobility -Progressing, not met   4. Pt will improve hip abduction strength from 3+/5 to 4/5 to improve functional gait deviation. -Progressing, not met      Long Term Goals: (12 weeks)  1. Pt will be independent with updated Home Exercise Program supplement PT in improving functional mobility. -Progressing, not met   2. Patient will improve Bilateral hip Active Range of Motion by 10 degrees to improve functional mobility -Progressing, not met   3. Patient will improve hip quadriceps strength by whole MMT rating Bilateral to improve functional mobility -Progressing, not met   4. Patient will improve hip abduction strength from 3+/5 to 4+/5 to improve functional gait deviation. -Progressing, not met   5. Patient goal: be pain free, be able to perform Activities of daily living without difficulty-Progressing, not met     Plan     Continue with POC     Andrew Valentine, PTA, STS

## 2022-11-23 ENCOUNTER — DOCUMENTATION ONLY (OUTPATIENT)
Dept: REHABILITATION | Facility: HOSPITAL | Age: 67
End: 2022-11-23
Payer: MEDICARE

## 2022-11-23 NOTE — PROGRESS NOTES
Pt coming to therapy to inform, that he cannot perform therapy today due to increased pain since last tx. Stated he feels the therapy is causing more pain. He was not able to sleep at all after last session. Reporting up coming doctor appointment where he will revisit with MD about pain symptoms.   Andrew Valentine PTA, STS

## 2022-11-28 NOTE — PROGRESS NOTES
Subjective:       Patient ID: Chuy Stevens is a 67 y.o. male.    Chief Complaint: COPD    HPI:   Chuy Stevens is a 67 y.o. male last seen by me 5/12/22 who presents for follow up.    Initial Eval 5/12/22  Believed he had covid 2/2020. Dx w/ emphysema at that time based on CT Chest. Also c/f NTM.     Started on triple therapy as well as CHELSY and CRICKET. Never underwent PFTs. Had a COPD exacerbation 1x in the last year. Reports a daily cough, productive of clear sputum. Reports GRAY after PNA 2/2020 that has steadily improved. Uses inhalers daily.    Denies chest pain with activity.     Interval History:  Did not get his CT or PFTs.    Reports improvement in his respiratory symptoms with the triple therapy. Reports a chronic am cough productive of white/yellow sputum. Denies any URIs since his last visit.     Denies f/c/ns, malaise, fatigue, weight loss. Did lose weight last October (2021), but gained back 10-15lbs since then.    Denies rashes hair/nail changes, dysphagia, eye changes, raynauds, mucosal ulcerations    Does report significant extremity pain worse after prolonged inactivity. This is his primary complaint today.    Exposures:  Work-   Tobacco- quit at the age of 27  Inhalational Agents- MJ daily, 3 joints per day  Mold- Denies  Pets- Denies  Birds- Denies  Medications- Denies  Travel- Denies  TB- Denies     Childhood history of Lung Disease: Denies    Review of Systems   Constitutional:  Negative for fever, chills, weight loss, activity change, appetite change, night sweats and weakness.   HENT:  Negative for postnasal drip, sinus pressure, voice change and congestion.    Eyes:  Negative for redness.   Respiratory:  Positive for cough, shortness of breath, dyspnea on extertion and use of rescue inhaler. Negative for sputum production, wheezing, previous hospitialization due to pulmonary problems, somnolence and Paroxysmal Nocturnal Dyspnea.    Cardiovascular:  Negative for chest  "pain, palpitations and leg swelling.   Musculoskeletal:  Negative for joint swelling and myalgias.   Skin:  Negative for rash.   Gastrointestinal:  Negative for acid reflux.   Neurological:  Negative for syncope and weakness.   Psychiatric/Behavioral:  Negative for sleep disturbance.        Social History     Tobacco Use    Smoking status: Never     Passive exposure: Past    Smokeless tobacco: Never    Tobacco comments:     occasionally smokes marijuana   Substance Use Topics    Alcohol use: Yes     Comment: frequent drinker       Review of patient's allergies indicates:  No Known Allergies  Past Medical History:   Diagnosis Date    Hypertension      No past surgical history on file.  Current Outpatient Medications on File Prior to Visit   Medication Sig    albuterol (PROVENTIL/VENTOLIN HFA) 90 mcg/actuation inhaler Inhale 2 puffs into the lungs every 6 (six) hours as needed for Wheezing.    albuterol (PROVENTIL/VENTOLIN HFA) 90 mcg/actuation inhaler Inhale 1-2 puffs into the lungs every 6 (six) hours as needed for Wheezing.    amLODIPine (NORVASC) 10 MG tablet Take 1 tablet (10 mg total) by mouth once daily.    ATROVENT HFA 17 mcg/actuation inhaler INHALE 2 PUFFS INTO THE LUNGS EVERY 6 HOURS FOR RESCUE    buPROPion (WELLBUTRIN XL) 150 MG TB24 tablet     busPIRone (BUSPAR) 10 MG tablet     diclofenac sodium (VOLTAREN) 1 % Gel APPLY 4 GRAMS TOPICALLY TO THE AFFECTED AREA FOUR TIMES DAILY    rosuvastatin (CRESTOR) 5 MG tablet     traZODone (DESYREL) 50 MG tablet     TRELEGY ELLIPTA 100-62.5-25 mcg DsDv      No current facility-administered medications on file prior to visit.       Objective:      Vitals:    11/29/22 1115   BP: 110/70   BP Location: Left arm   Patient Position: Sitting   Pulse: 85   SpO2: 98%   Weight: 62.8 kg (138 lb 7.2 oz)   Height: 5' 11" (1.803 m)       Physical Exam   Constitutional: He is oriented to person, place, and time. He appears well-developed and well-nourished.   HENT:   Nose: No " mucosal edema.   Mouth/Throat: Oropharynx is clear and moist. Mallampati Score: I.   Neck: No tracheal deviation present.   Cardiovascular: Normal rate, regular rhythm and intact distal pulses.   Pulmonary/Chest: Normal expansion, symmetric chest wall expansion, effort normal and breath sounds normal. No respiratory distress. He has no wheezes. He has no rhonchi. He has no rales.   Abdominal: He exhibits no distension.   Musculoskeletal:         General: No edema.      Cervical back: Neck supple.   Lymphadenopathy: No supraclavicular adenopathy is present.     He has no cervical adenopathy.   Neurological: He is alert and oriented to person, place, and time.   Skin: Skin is warm and dry. No cyanosis or erythema. Nails show no clubbing.   Psychiatric: He has a normal mood and affect.   Nursing note and vitals reviewed.    Personal Diagnostic Review    Laboratory:  4/7/22 Bicarb- 28  Eosinophils- no elevations      CT Chest 2/10/20- Images personally reviewed. I agree w/ the radiologist who notes  1. No pulmonary thromboembolism.  2. Pulmonary emphysematous change noting peribronchial thickening centrally, could reflect bronchial airway infection or inflammation.  3. Multifocal tree-in-bud type nodules with additional regions of consolidative opacity with central lucency, may reflect early cavitation.  The largest focus is within the right lower lobe.  Findings are nonspecific and could reflect sequela of multifocal infection, correlation is recommended.  Given background emphysema, malignancy can not be excluded in this setting.  Correlation with patient history and clinical symptoms is recommended.  Short-term follow-up, no greater than 3 months, is recommended to ensure resolution and assess for progression.  Comparison with any previous examinations would be helpful.    CXR 11/11/21- Images personally reviewed and compared to priors. I agree w/ the radiologist who notes;  Single frontal view of the chest shows  advanced emphysematous changes which were seen previously.  There is hyperinflation with flattening of the diaphragms.  There is interstitial scarring appreciated.  No indication of a consolidative process or pleural effusion.  There is a pulmonary nodule appreciated the central aspect of the left lung which was present previously remains unchanged.  It was also documented on a previous chest x-ray dated 2012.    PFTs   22  FVC: 3.40 (76.3 % predicted), FEV1: 1.73 (51.3 % predicted), FEV1/FVC:  51, T.83 (120.5 % predicted), DLCO: 14.64 (51.5 % predicted)  Mod-Severe obstruction, air trapping/hyperinflation, moderate reduction in DLCO    2022---------Distance: 396.24 meters (1300 feet)       O2 Sat % Supplemental Oxygen Heart Rate Blood Pressure Mary Scale   Pre-exercise  (Resting) 98 % Room Air 80 bpm 139/92 mmHg 2   During Exercise 97 % Room Air 101 bpm 152/92 mmHg 3   Post-exercise  (Recovery) 98 % Room Air  94 bpm          Recovery Time: 72 seconds     No flowsheet data found.          Assessment:     Problem List Items Addressed This Visit          Pulmonary    Pulmonary nodules     Ordered CT Chest again today.         COPD (chronic obstructive pulmonary disease) - Primary     Notes improvement in respiratory symptoms w/ triple therapy. Cont to smoke MJ daily and is not willing to stop.    PFTs w/ mod-severe obstruction, hyperinflation and moderate reduction in DLCO.    - Cont current inhaler therapy  - Still needs the CT Chest, will order again today  - Encouraged smoking cessation  - Regular, progressive exercise         Relevant Orders    Stress test, pulmonary (Completed)       25 minutes of total time spent on the encounter, which includes face to face time and non-face to face time preparing to see the patient (eg, review of tests), Obtaining and/or reviewing separately obtained history, Documenting clinical information in the electronic or other health record, Independently  interpreting results (not separately reported) and communicating results to the patient/family/caregiver, or Care coordination (not separately reported).

## 2022-11-29 ENCOUNTER — HOSPITAL ENCOUNTER (OUTPATIENT)
Dept: PULMONOLOGY | Facility: CLINIC | Age: 67
Discharge: HOME | End: 2022-11-29
Payer: MEDICARE

## 2022-11-29 ENCOUNTER — OFFICE VISIT (OUTPATIENT)
Dept: PULMONOLOGY | Facility: CLINIC | Age: 67
End: 2022-11-29
Payer: MEDICARE

## 2022-11-29 VITALS
SYSTOLIC BLOOD PRESSURE: 110 MMHG | HEIGHT: 71 IN | HEART RATE: 85 BPM | BODY MASS INDEX: 19.38 KG/M2 | WEIGHT: 138.44 LBS | DIASTOLIC BLOOD PRESSURE: 70 MMHG | OXYGEN SATURATION: 98 %

## 2022-11-29 VITALS — WEIGHT: 138.25 LBS | BODY MASS INDEX: 19.79 KG/M2 | HEIGHT: 70 IN

## 2022-11-29 DIAGNOSIS — J43.2 CENTRILOBULAR EMPHYSEMA: ICD-10-CM

## 2022-11-29 DIAGNOSIS — J44.9 CHRONIC OBSTRUCTIVE PULMONARY DISEASE, UNSPECIFIED COPD TYPE: ICD-10-CM

## 2022-11-29 DIAGNOSIS — J44.9 CHRONIC OBSTRUCTIVE PULMONARY DISEASE, UNSPECIFIED COPD TYPE: Primary | ICD-10-CM

## 2022-11-29 DIAGNOSIS — R91.8 PULMONARY NODULES: ICD-10-CM

## 2022-11-29 LAB
DLCO SINGLE BREATH LLN: 21.49
DLCO SINGLE BREATH PRE REF: 51.5 %
DLCO SINGLE BREATH REF: 28.42
DLCOC SBVA LLN: 2.75
DLCOC SBVA REF: 3.88
DLCOC SINGLE BREATH LLN: 21.49
DLCOC SINGLE BREATH REF: 28.42
DLCOCSBVAULN: 5
DLCOCSINGLEBREATHULN: 35.35
DLCOSINGLEBREATHULN: 35.35
DLCOVA LLN: 2.75
DLCOVA PRE REF: 62 %
DLCOVA PRE: 2.4 ML/(MIN*MMHG*L) (ref 2.75–5)
DLCOVA REF: 3.88
DLCOVAULN: 5
ERV LLN: -16448.89
ERV PRE REF: 103.1 %
ERV REF: 1.11
ERVULN: ABNORMAL
FEF 25 75 LLN: 1.15
FEF 25 75 PRE REF: 30.4 %
FEF 25 75 REF: 2.58
FEV05 LLN: 1.64
FEV05 REF: 2.77
FEV1 FVC LLN: 63
FEV1 FVC PRE REF: 67 %
FEV1 FVC REF: 76
FEV1 LLN: 2.46
FEV1 PRE REF: 51.3 %
FEV1 REF: 3.38
FRCPLETH LLN: 2.75
FRCPLETH PREREF: 165.5 %
FRCPLETH REF: 3.74
FRCPLETHULN: 4.73
FVC LLN: 3.32
FVC PRE REF: 76.3 %
FVC REF: 4.46
IVC PRE: 3.7 L (ref 3.32–5.61)
IVC SINGLE BREATH LLN: 3.32
IVC SINGLE BREATH PRE REF: 83 %
IVC SINGLE BREATH REF: 4.46
IVCSINGLEBREATHULN: 5.61
LLN IC: -9999996.67
PEF LLN: 6.4
PEF PRE REF: 44.6 %
PEF REF: 8.78
PHYSICIAN COMMENT: ABNORMAL
PRE DLCO: 14.64 ML/(MIN*MMHG) (ref 21.49–35.35)
PRE ERV: 1.15 L (ref -16448.89–16451.11)
PRE FEF 25 75: 0.79 L/S (ref 1.15–4.6)
PRE FET 100: 7.4 SEC
PRE FEV05 REF: 41.5 %
PRE FEV1 FVC: 50.97 % (ref 62.89–87.63)
PRE FEV1: 1.73 L (ref 2.46–4.24)
PRE FEV5: 1.15 L (ref 1.64–3.91)
PRE FRC PL: 6.19 L (ref 2.75–4.73)
PRE FVC: 3.4 L (ref 3.32–5.61)
PRE IC: 2.64 L (ref -9999996.67–#######.####)
PRE PEF: 3.91 L/S (ref 6.4–11.16)
PRE REF IC: 79.1 %
PRE RV: 5.04 L (ref 1.95–3.3)
PRE TLC: 8.83 L (ref 6.18–8.48)
PRE VTG: 6.33 L
RAW PRE REF: 148.4 %
RAW PRE: 4.54 CMH2O*S/L (ref 3.06–3.06)
RAW REF: 3.06
REF IC: 3.33
RV LLN: 1.95
RV PRE REF: 191.9 %
RV REF: 2.63
RVTLC LLN: 31
RVTLC PRE REF: 141.1 %
RVTLC PRE: 57.13 % (ref 31.5–49.46)
RVTLC REF: 40
RVTLCULN: 49
RVULN: 3.3
SGAW PRE REF: 38.2 %
SGAW PRE: 0.03 1/(CMH2O*S) (ref 0.08–0.08)
SGAW REF: 0.08
TLC LLN: 6.18
TLC PRE REF: 120.5 %
TLC REF: 7.33
TLC ULN: 8.48
ULN IC: ABNORMAL
VA PRE: 6.09 L (ref 7.18–7.18)
VA SINGLE BREATH LLN: 7.18
VA SINGLE BREATH PRE REF: 84.8 %
VA SINGLE BREATH REF: 7.18
VASINGLEBREATHULN: 7.18
VC LLN: 3.32
VC PRE REF: 84.9 %
VC PRE: 3.79 L (ref 3.32–5.61)
VC REF: 4.46
VC ULN: 5.61

## 2022-11-29 PROCEDURE — 3078F PR MOST RECENT DIASTOLIC BLOOD PRESSURE < 80 MM HG: ICD-10-PCS | Mod: CPTII,S$GLB,, | Performed by: INTERNAL MEDICINE

## 2022-11-29 PROCEDURE — 1159F MED LIST DOCD IN RCRD: CPT | Mod: CPTII,S$GLB,, | Performed by: INTERNAL MEDICINE

## 2022-11-29 PROCEDURE — 94618 PULMONARY STRESS TESTING: CPT | Mod: S$GLB,,, | Performed by: INTERNAL MEDICINE

## 2022-11-29 PROCEDURE — 1126F AMNT PAIN NOTED NONE PRSNT: CPT | Mod: CPTII,S$GLB,, | Performed by: INTERNAL MEDICINE

## 2022-11-29 PROCEDURE — 94010 BREATHING CAPACITY TEST: CPT | Mod: S$GLB,,, | Performed by: INTERNAL MEDICINE

## 2022-11-29 PROCEDURE — 3078F DIAST BP <80 MM HG: CPT | Mod: CPTII,S$GLB,, | Performed by: INTERNAL MEDICINE

## 2022-11-29 PROCEDURE — 99999 PR PBB SHADOW E&M-EST. PATIENT-LVL III: CPT | Mod: PBBFAC,,, | Performed by: INTERNAL MEDICINE

## 2022-11-29 PROCEDURE — 1159F PR MEDICATION LIST DOCUMENTED IN MEDICAL RECORD: ICD-10-PCS | Mod: CPTII,S$GLB,, | Performed by: INTERNAL MEDICINE

## 2022-11-29 PROCEDURE — 1101F PT FALLS ASSESS-DOCD LE1/YR: CPT | Mod: CPTII,S$GLB,, | Performed by: INTERNAL MEDICINE

## 2022-11-29 PROCEDURE — 99213 OFFICE O/P EST LOW 20 MIN: CPT | Mod: 25,S$GLB,, | Performed by: INTERNAL MEDICINE

## 2022-11-29 PROCEDURE — 99213 PR OFFICE/OUTPT VISIT, EST, LEVL III, 20-29 MIN: ICD-10-PCS | Mod: 25,S$GLB,, | Performed by: INTERNAL MEDICINE

## 2022-11-29 PROCEDURE — 3288F FALL RISK ASSESSMENT DOCD: CPT | Mod: CPTII,S$GLB,, | Performed by: INTERNAL MEDICINE

## 2022-11-29 PROCEDURE — 94726 PULM FUNCT TST PLETHYSMOGRAP: ICD-10-PCS | Mod: S$GLB,,, | Performed by: INTERNAL MEDICINE

## 2022-11-29 PROCEDURE — 94729 DIFFUSING CAPACITY: CPT | Mod: S$GLB,,, | Performed by: INTERNAL MEDICINE

## 2022-11-29 PROCEDURE — 94729 PR C02/MEMBANE DIFFUSE CAPACITY: ICD-10-PCS | Mod: S$GLB,,, | Performed by: INTERNAL MEDICINE

## 2022-11-29 PROCEDURE — 3008F BODY MASS INDEX DOCD: CPT | Mod: CPTII,S$GLB,, | Performed by: INTERNAL MEDICINE

## 2022-11-29 PROCEDURE — 3074F SYST BP LT 130 MM HG: CPT | Mod: CPTII,S$GLB,, | Performed by: INTERNAL MEDICINE

## 2022-11-29 PROCEDURE — 1126F PR PAIN SEVERITY QUANTIFIED, NO PAIN PRESENT: ICD-10-PCS | Mod: CPTII,S$GLB,, | Performed by: INTERNAL MEDICINE

## 2022-11-29 PROCEDURE — 99999 PR PBB SHADOW E&M-EST. PATIENT-LVL III: ICD-10-PCS | Mod: PBBFAC,,, | Performed by: INTERNAL MEDICINE

## 2022-11-29 PROCEDURE — 94010 BREATHING CAPACITY TEST: ICD-10-PCS | Mod: S$GLB,,, | Performed by: INTERNAL MEDICINE

## 2022-11-29 PROCEDURE — 3288F PR FALLS RISK ASSESSMENT DOCUMENTED: ICD-10-PCS | Mod: CPTII,S$GLB,, | Performed by: INTERNAL MEDICINE

## 2022-11-29 PROCEDURE — 94726 PLETHYSMOGRAPHY LUNG VOLUMES: CPT | Mod: S$GLB,,, | Performed by: INTERNAL MEDICINE

## 2022-11-29 PROCEDURE — 3008F PR BODY MASS INDEX (BMI) DOCUMENTED: ICD-10-PCS | Mod: CPTII,S$GLB,, | Performed by: INTERNAL MEDICINE

## 2022-11-29 PROCEDURE — 94618 PULMONARY STRESS TESTING: ICD-10-PCS | Mod: S$GLB,,, | Performed by: INTERNAL MEDICINE

## 2022-11-29 PROCEDURE — 1101F PR PT FALLS ASSESS DOC 0-1 FALLS W/OUT INJ PAST YR: ICD-10-PCS | Mod: CPTII,S$GLB,, | Performed by: INTERNAL MEDICINE

## 2022-11-29 PROCEDURE — 3074F PR MOST RECENT SYSTOLIC BLOOD PRESSURE < 130 MM HG: ICD-10-PCS | Mod: CPTII,S$GLB,, | Performed by: INTERNAL MEDICINE

## 2022-11-29 RX ORDER — BUSPIRONE HYDROCHLORIDE 10 MG/1
TABLET ORAL
COMMUNITY
Start: 2022-11-07

## 2022-11-29 RX ORDER — TRAZODONE HYDROCHLORIDE 50 MG/1
TABLET ORAL
COMMUNITY
Start: 2022-08-24

## 2022-11-29 RX ORDER — BUPROPION HYDROCHLORIDE 150 MG/1
TABLET ORAL
COMMUNITY
Start: 2022-10-14

## 2022-11-29 RX ORDER — DICLOFENAC SODIUM 10 MG/G
GEL TOPICAL
COMMUNITY
Start: 2022-08-30

## 2022-11-29 RX ORDER — ROSUVASTATIN CALCIUM 5 MG/1
TABLET, COATED ORAL
COMMUNITY
Start: 2022-11-17

## 2022-11-30 NOTE — ASSESSMENT & PLAN NOTE
Notes improvement in respiratory symptoms w/ triple therapy. Cont to smoke MJ daily and is not willing to stop.    PFTs w/ mod-severe obstruction, hyperinflation and moderate reduction in DLCO.    - Cont current inhaler therapy  - Still needs the CT Chest, will order again today  - Encouraged smoking cessation  - Regular, progressive exercise

## 2022-11-30 NOTE — PROCEDURES
Chuy Stevens is a 67 y.o.  male patient, who presents for a 6 minute walk test ordered by MD Hunter.  The diagnosis is Shortness of Breath.  The patient's BMI is 19.8 kg/m2.  Predicted distance (lower limit of normal) is 404 meters.      Test Results:    The test was completed without stopping.  The total time walked was 360 seconds.  During walking, the patient reported:  Dyspnea; Leg/Knee pain.  The patient used no assistive devices during testing.     11/29/2022---------Distance: 396.24 meters (1300 feet)     O2 Sat % Supplemental Oxygen Heart Rate Blood Pressure Mary Scale   Pre-exercise  (Resting) 98 % Room Air 80 bpm 139/92 mmHg 2   During Exercise 97 % Room Air 101 bpm 152/92 mmHg 3   Post-exercise  (Recovery) 98 % Room Air  94 bpm       Recovery Time: 72 seconds    Performing nurse/tech: Mercedes BAEZ      PREVIOUS STUDY:   The patient has not had a previous study.      CLINICAL INTERPRETATION:  Six minute walk distance is 396.24 meters (1300 feet) with moderate dyspnea.  During exercise, there was no significant desaturation while breathing room air.  Blood pressure remained stable and Heart rate increased significantly with walking.  Hypertension was present prior to exercise.  The patient reported non-pulmonary symptoms during exercise.  No previous study performed.  Based upon age and body mass index, exercise capacity is less than predicted.

## 2023-03-14 ENCOUNTER — HOSPITAL ENCOUNTER (OUTPATIENT)
Dept: RADIOLOGY | Facility: HOSPITAL | Age: 68
Discharge: HOME | End: 2023-03-14
Attending: NURSE PRACTITIONER
Payer: MEDICARE

## 2023-03-14 ENCOUNTER — HOSPITAL ENCOUNTER (OUTPATIENT)
Dept: RADIOLOGY | Facility: HOSPITAL | Age: 68
Discharge: HOME | End: 2023-03-14
Attending: INTERNAL MEDICINE
Payer: MEDICARE

## 2023-03-14 DIAGNOSIS — R91.8 PULMONARY NODULES: ICD-10-CM

## 2023-03-14 DIAGNOSIS — M06.9 RHEUMATOID ARTHRITIS: Primary | ICD-10-CM

## 2023-03-14 DIAGNOSIS — M06.9 RHEUMATOID ARTHRITIS: ICD-10-CM

## 2023-03-14 PROCEDURE — 71250 CT THORAX DX C-: CPT | Mod: TC

## 2023-03-14 PROCEDURE — 73130 XR HAND COMPLETE 3 VIEWS BILATERAL: ICD-10-PCS | Mod: 26,50,, | Performed by: RADIOLOGY

## 2023-03-14 PROCEDURE — 71250 CT CHEST WITHOUT CONTRAST: ICD-10-PCS | Mod: 26,,, | Performed by: RADIOLOGY

## 2023-03-14 PROCEDURE — 73130 X-RAY EXAM OF HAND: CPT | Mod: TC,50

## 2023-03-14 PROCEDURE — 73130 X-RAY EXAM OF HAND: CPT | Mod: 26,50,, | Performed by: RADIOLOGY

## 2023-03-14 PROCEDURE — 71250 CT THORAX DX C-: CPT | Mod: 26,,, | Performed by: RADIOLOGY

## 2023-03-23 ENCOUNTER — OFFICE VISIT (OUTPATIENT)
Dept: INFECTIOUS DISEASES | Facility: CLINIC | Age: 68
End: 2023-03-23
Payer: MEDICARE

## 2023-03-23 VITALS
HEIGHT: 70 IN | WEIGHT: 138.25 LBS | BODY MASS INDEX: 19.79 KG/M2 | TEMPERATURE: 99 F | DIASTOLIC BLOOD PRESSURE: 77 MMHG | SYSTOLIC BLOOD PRESSURE: 136 MMHG | HEART RATE: 72 BPM

## 2023-03-23 DIAGNOSIS — R91.8 ABNORMAL CT SCAN OF LUNG: Primary | ICD-10-CM

## 2023-03-23 DIAGNOSIS — L98.9 SKIN LESIONS: ICD-10-CM

## 2023-03-23 DIAGNOSIS — R25.1 TREMORS OF NERVOUS SYSTEM: ICD-10-CM

## 2023-03-23 PROCEDURE — 99204 OFFICE O/P NEW MOD 45 MIN: CPT | Mod: S$GLB,,, | Performed by: INTERNAL MEDICINE

## 2023-03-23 PROCEDURE — 1101F PT FALLS ASSESS-DOCD LE1/YR: CPT | Mod: CPTII,S$GLB,, | Performed by: INTERNAL MEDICINE

## 2023-03-23 PROCEDURE — 1126F AMNT PAIN NOTED NONE PRSNT: CPT | Mod: CPTII,S$GLB,, | Performed by: INTERNAL MEDICINE

## 2023-03-23 PROCEDURE — 3008F PR BODY MASS INDEX (BMI) DOCUMENTED: ICD-10-PCS | Mod: CPTII,S$GLB,, | Performed by: INTERNAL MEDICINE

## 2023-03-23 PROCEDURE — 3008F BODY MASS INDEX DOCD: CPT | Mod: CPTII,S$GLB,, | Performed by: INTERNAL MEDICINE

## 2023-03-23 PROCEDURE — 3075F PR MOST RECENT SYSTOLIC BLOOD PRESS GE 130-139MM HG: ICD-10-PCS | Mod: CPTII,S$GLB,, | Performed by: INTERNAL MEDICINE

## 2023-03-23 PROCEDURE — 3288F FALL RISK ASSESSMENT DOCD: CPT | Mod: CPTII,S$GLB,, | Performed by: INTERNAL MEDICINE

## 2023-03-23 PROCEDURE — 1101F PR PT FALLS ASSESS DOC 0-1 FALLS W/OUT INJ PAST YR: ICD-10-PCS | Mod: CPTII,S$GLB,, | Performed by: INTERNAL MEDICINE

## 2023-03-23 PROCEDURE — 3288F PR FALLS RISK ASSESSMENT DOCUMENTED: ICD-10-PCS | Mod: CPTII,S$GLB,, | Performed by: INTERNAL MEDICINE

## 2023-03-23 PROCEDURE — 99999 PR PBB SHADOW E&M-EST. PATIENT-LVL V: ICD-10-PCS | Mod: PBBFAC,,, | Performed by: INTERNAL MEDICINE

## 2023-03-23 PROCEDURE — 99204 PR OFFICE/OUTPT VISIT, NEW, LEVL IV, 45-59 MIN: ICD-10-PCS | Mod: S$GLB,,, | Performed by: INTERNAL MEDICINE

## 2023-03-23 PROCEDURE — 3078F DIAST BP <80 MM HG: CPT | Mod: CPTII,S$GLB,, | Performed by: INTERNAL MEDICINE

## 2023-03-23 PROCEDURE — 1159F MED LIST DOCD IN RCRD: CPT | Mod: CPTII,S$GLB,, | Performed by: INTERNAL MEDICINE

## 2023-03-23 PROCEDURE — 1159F PR MEDICATION LIST DOCUMENTED IN MEDICAL RECORD: ICD-10-PCS | Mod: CPTII,S$GLB,, | Performed by: INTERNAL MEDICINE

## 2023-03-23 PROCEDURE — 3075F SYST BP GE 130 - 139MM HG: CPT | Mod: CPTII,S$GLB,, | Performed by: INTERNAL MEDICINE

## 2023-03-23 PROCEDURE — 1126F PR PAIN SEVERITY QUANTIFIED, NO PAIN PRESENT: ICD-10-PCS | Mod: CPTII,S$GLB,, | Performed by: INTERNAL MEDICINE

## 2023-03-23 PROCEDURE — 3078F PR MOST RECENT DIASTOLIC BLOOD PRESSURE < 80 MM HG: ICD-10-PCS | Mod: CPTII,S$GLB,, | Performed by: INTERNAL MEDICINE

## 2023-03-23 PROCEDURE — 99999 PR PBB SHADOW E&M-EST. PATIENT-LVL V: CPT | Mod: PBBFAC,,, | Performed by: INTERNAL MEDICINE

## 2023-03-23 NOTE — PROGRESS NOTES
"Infectious Disease Clinic Note  03/23/2023       Subjective:       Patient ID: Chuy Stevens is a 67 y.o. male being seen for an new visit.    Chief Complaint: Recurrent Skin Infections    HPI  Mr. Stevens is a 65 y/o M pt with hx of HTN who presents for skin lesions.        Last seen in ID clinic on 4/2022 for "HIV," but  HIV test results were negative. He is now here for skin lesions. Reports he gets "blisters" underneath the skin that are pruritic. He has noticed them since approx 5 mths ago. They will show up on his arm, resolve, then a new one will pop up at a different site (including opposite arm, leg). Also has pruritic skin on lower back. Pt is not immunocompromised. Denies animal exposures or recent travel. Works as a . Skin lesions coincided with onset of his "arthritis" symptoms.  Reports body aches and weakness for 6-8 months. Says he was told he may have rheumatoid arthritis. Has f/u with rheum. Reports he also has intermittent tremors, but has yet to see neuro (referral placed last yr).    CT scan 3/14/23 revealed centrilobular emphysema, interval resolution of consolidations, pulm nodules and cavitary mass. Has micro nodules.  Taking a daily multi vitamin. Denies fevers, chills, sweats. Has a chronic cough and dyspnea.        Social:  Born in [place]. Moved to [facility] in his 20's. No foreign travel. Works as a . He smoked cigarettes until he was 28y/o. Smokes marihuana, but denies using any other illicit drugs. Has sex with males and females. No known hx of STIs.    No family history on file.  Social History     Socioeconomic History    Marital status: Single   Tobacco Use    Smoking status: Never     Passive exposure: Past    Smokeless tobacco: Never    Tobacco comments:     occasionally smokes marijuana   Substance and Sexual Activity    Alcohol use: Yes     Comment: frequent drinker    Drug use: Yes     Types: Marijuana     Comment: occasionally smokes marijuana     No past " surgical history on file.    Patient's Medications   New Prescriptions    No medications on file   Previous Medications    ALBUTEROL (PROVENTIL/VENTOLIN HFA) 90 MCG/ACTUATION INHALER    Inhale 2 puffs into the lungs every 6 (six) hours as needed for Wheezing.    ALBUTEROL (PROVENTIL/VENTOLIN HFA) 90 MCG/ACTUATION INHALER    Inhale 1-2 puffs into the lungs every 6 (six) hours as needed for Wheezing.    AMLODIPINE (NORVASC) 10 MG TABLET    Take 1 tablet (10 mg total) by mouth once daily.    ATROVENT HFA 17 MCG/ACTUATION INHALER    INHALE 2 PUFFS INTO THE LUNGS EVERY 6 HOURS FOR RESCUE    BUPROPION (WELLBUTRIN XL) 150 MG TB24 TABLET        BUSPIRONE (BUSPAR) 10 MG TABLET        DICLOFENAC SODIUM (VOLTAREN) 1 % GEL    APPLY 4 GRAMS TOPICALLY TO THE AFFECTED AREA FOUR TIMES DAILY    ROSUVASTATIN (CRESTOR) 5 MG TABLET        TRAZODONE (DESYREL) 50 MG TABLET        TRELEGY ELLIPTA 100-62.5-25 MCG DSDV       Modified Medications    No medications on file   Discontinued Medications    No medications on file       Patient Active Problem List    Diagnosis Date Noted    Decreased strength of lower extremity 10/19/2022    Decreased functional mobility 10/19/2022    COPD (chronic obstructive pulmonary disease) 11/17/2021    Cachexia 02/10/2020    Pulmonary nodules 02/10/2020    Hypertension 02/10/2020    Fatigue 02/10/2020    Abnormal chest x-ray 02/10/2020       Review of Systems   Review of Systems   Constitutional:  Positive for malaise/fatigue. Negative for chills, diaphoresis, fever and weight loss.   HENT:  Negative for congestion and sore throat.    Respiratory:  Positive for cough and shortness of breath. Negative for hemoptysis (chronic).    Cardiovascular:  Negative for chest pain and leg swelling.   Gastrointestinal:  Negative for abdominal pain, diarrhea, nausea and vomiting.   Genitourinary:  Negative for dysuria and frequency.   Musculoskeletal:  Positive for joint pain. Negative for myalgias.   Neurological:   "Negative for dizziness and headaches.   Psychiatric/Behavioral:  Negative for memory loss.          Objective:      /77 (BP Location: Left arm, Patient Position: Sitting)   Pulse 72   Temp 98.5 °F (36.9 °C) (Oral)   Ht 5' 10" (1.778 m)   Wt 62.7 kg (138 lb 3.7 oz)   BMI 19.83 kg/m²   Estimated body mass index is 19.83 kg/m² as calculated from the following:    Height as of this encounter: 5' 10" (1.778 m).    Weight as of this encounter: 62.7 kg (138 lb 3.7 oz).    Physical Exam  Vitals reviewed.   Constitutional:       Appearance: Normal appearance.   HENT:      Head: Normocephalic and atraumatic.      Nose: Nose normal.      Mouth/Throat:      Mouth: Mucous membranes are moist.      Pharynx: No oropharyngeal exudate.   Eyes:      Conjunctiva/sclera: Conjunctivae normal.      Pupils: Pupils are equal, round, and reactive to light.   Cardiovascular:      Rate and Rhythm: Normal rate and regular rhythm.   Pulmonary:      Effort: Pulmonary effort is normal.      Comments: Mild wheezing at rt lung  Abdominal:      General: Abdomen is flat. There is no distension.      Palpations: Abdomen is soft.      Tenderness: There is no abdominal tenderness.   Musculoskeletal:         General: No swelling. Normal range of motion.      Cervical back: Normal range of motion and neck supple.   Skin:     General: Skin is warm.      Coloration: Skin is not jaundiced.   Neurological:      General: No focal deficit present.      Mental Status: He is alert and oriented to person, place, and time.      Comments: B/l hand intention tremors L>R   Psychiatric:         Mood and Affect: Mood normal.         Behavior: Behavior normal.       Assessment:         1. Abnormal CT scan of lung  AFB Culture & Smear    CANCELED: AFB Culture & Smear      2. Tremors of nervous system  Ambulatory referral/consult to Neurology      3. Skin lesions  Ambulatory referral/consult to Dermatology              Plan:       Chuy was seen today for hiv " positive/aids.    Diagnoses and all orders for this visit:     Skin lesion  Unclear etiology. Due to chronicity of symptoms lower suspicion for insect bites. Possible folliculitis (but no erythema or purulence), Erysipelothrix schenkii (given hx of yard work, but unusual distribution), rheumatoid nodules.  -continue topical steroid cream for symptom relief  -counseled on signs and symptoms of infection and to notify me if these were to arise  -derm referral    Abnormal CT scan of lung  -Repeat CT 3/14/23 with interval resolution of consolidations, cavitary nodules and masses.  -will order afb sputum to eval for NTM infection, but suspect dyspnea due to emphysema and chronic lung disease    Tremors of nervous system  -     Ambulatory referral/consult to Neurology; Future    HTN  -f/u with PCP    RTC PRN     Gracy Josue MD  Infectious Disease       Total professional time spent for the encounter: 45 minutes  Time was spent preparing to see the patient, reviewing results of prior testing, obtaining and/or reviewing separately obtained history, performing a medically appropriate examination and interview, counseling and educating the patient/family, ordering medications/tests/procedures, referring and communicating with other health care professionals, documenting clinical information in the electronic health record, and independently interpreting results.

## 2023-07-05 ENCOUNTER — HOSPITAL ENCOUNTER (OUTPATIENT)
Dept: RADIOLOGY | Facility: HOSPITAL | Age: 68
Discharge: HOME | End: 2023-07-05
Attending: INTERNAL MEDICINE
Payer: MEDICARE

## 2023-07-05 ENCOUNTER — OFFICE VISIT (OUTPATIENT)
Dept: RHEUMATOLOGY | Facility: CLINIC | Age: 68
End: 2023-07-05
Payer: MEDICARE

## 2023-07-05 VITALS
SYSTOLIC BLOOD PRESSURE: 128 MMHG | HEART RATE: 68 BPM | HEIGHT: 70 IN | BODY MASS INDEX: 20.99 KG/M2 | DIASTOLIC BLOOD PRESSURE: 73 MMHG | WEIGHT: 146.63 LBS

## 2023-07-05 DIAGNOSIS — G89.29 CHRONIC PAIN OF BOTH SHOULDERS: ICD-10-CM

## 2023-07-05 DIAGNOSIS — R53.1 GENERALIZED WEAKNESS: ICD-10-CM

## 2023-07-05 DIAGNOSIS — M25.511 CHRONIC PAIN OF BOTH SHOULDERS: ICD-10-CM

## 2023-07-05 DIAGNOSIS — M25.512 CHRONIC PAIN OF BOTH SHOULDERS: ICD-10-CM

## 2023-07-05 DIAGNOSIS — M25.50 PAIN IN JOINT INVOLVING MULTIPLE SITES: Primary | ICD-10-CM

## 2023-07-05 DIAGNOSIS — J44.9 CHRONIC OBSTRUCTIVE PULMONARY DISEASE, UNSPECIFIED COPD TYPE: ICD-10-CM

## 2023-07-05 DIAGNOSIS — M11.20 CHONDROCALCINOSIS: ICD-10-CM

## 2023-07-05 DIAGNOSIS — R53.83 FATIGUE, UNSPECIFIED TYPE: ICD-10-CM

## 2023-07-05 DIAGNOSIS — M25.50 PAIN IN JOINT INVOLVING MULTIPLE SITES: ICD-10-CM

## 2023-07-05 DIAGNOSIS — M15.9 GENERALIZED OSTEOARTHRITIS OF MULTIPLE SITES: ICD-10-CM

## 2023-07-05 PROCEDURE — 3008F BODY MASS INDEX DOCD: CPT | Mod: CPTII,S$GLB,, | Performed by: INTERNAL MEDICINE

## 2023-07-05 PROCEDURE — 99999 PR PBB SHADOW E&M-EST. PATIENT-LVL IV: CPT | Mod: PBBFAC,,, | Performed by: INTERNAL MEDICINE

## 2023-07-05 PROCEDURE — 1159F MED LIST DOCD IN RCRD: CPT | Mod: CPTII,S$GLB,, | Performed by: INTERNAL MEDICINE

## 2023-07-05 PROCEDURE — 1159F PR MEDICATION LIST DOCUMENTED IN MEDICAL RECORD: ICD-10-PCS | Mod: CPTII,S$GLB,, | Performed by: INTERNAL MEDICINE

## 2023-07-05 PROCEDURE — 1125F AMNT PAIN NOTED PAIN PRSNT: CPT | Mod: CPTII,S$GLB,, | Performed by: INTERNAL MEDICINE

## 2023-07-05 PROCEDURE — 1125F PR PAIN SEVERITY QUANTIFIED, PAIN PRESENT: ICD-10-PCS | Mod: CPTII,S$GLB,, | Performed by: INTERNAL MEDICINE

## 2023-07-05 PROCEDURE — 3078F PR MOST RECENT DIASTOLIC BLOOD PRESSURE < 80 MM HG: ICD-10-PCS | Mod: CPTII,S$GLB,, | Performed by: INTERNAL MEDICINE

## 2023-07-05 PROCEDURE — 3074F PR MOST RECENT SYSTOLIC BLOOD PRESSURE < 130 MM HG: ICD-10-PCS | Mod: CPTII,S$GLB,, | Performed by: INTERNAL MEDICINE

## 2023-07-05 PROCEDURE — 73030 X-RAY EXAM OF SHOULDER: CPT | Mod: TC,50

## 2023-07-05 PROCEDURE — 1160F RVW MEDS BY RX/DR IN RCRD: CPT | Mod: CPTII,S$GLB,, | Performed by: INTERNAL MEDICINE

## 2023-07-05 PROCEDURE — 73030 XR SHOULDER COMPLETE 2 OR MORE VIEWS BILATERAL: ICD-10-PCS | Mod: 26,50,, | Performed by: RADIOLOGY

## 2023-07-05 PROCEDURE — 99999 PR PBB SHADOW E&M-EST. PATIENT-LVL IV: ICD-10-PCS | Mod: PBBFAC,,, | Performed by: INTERNAL MEDICINE

## 2023-07-05 PROCEDURE — 99204 PR OFFICE/OUTPT VISIT, NEW, LEVL IV, 45-59 MIN: ICD-10-PCS | Mod: S$GLB,,, | Performed by: INTERNAL MEDICINE

## 2023-07-05 PROCEDURE — 1160F PR REVIEW ALL MEDS BY PRESCRIBER/CLIN PHARMACIST DOCUMENTED: ICD-10-PCS | Mod: CPTII,S$GLB,, | Performed by: INTERNAL MEDICINE

## 2023-07-05 PROCEDURE — 4010F PR ACE/ARB THEARPY RXD/TAKEN: ICD-10-PCS | Mod: CPTII,S$GLB,, | Performed by: INTERNAL MEDICINE

## 2023-07-05 PROCEDURE — 3078F DIAST BP <80 MM HG: CPT | Mod: CPTII,S$GLB,, | Performed by: INTERNAL MEDICINE

## 2023-07-05 PROCEDURE — 4010F ACE/ARB THERAPY RXD/TAKEN: CPT | Mod: CPTII,S$GLB,, | Performed by: INTERNAL MEDICINE

## 2023-07-05 PROCEDURE — 73030 X-RAY EXAM OF SHOULDER: CPT | Mod: 26,50,, | Performed by: RADIOLOGY

## 2023-07-05 PROCEDURE — 3008F PR BODY MASS INDEX (BMI) DOCUMENTED: ICD-10-PCS | Mod: CPTII,S$GLB,, | Performed by: INTERNAL MEDICINE

## 2023-07-05 PROCEDURE — 99204 OFFICE O/P NEW MOD 45 MIN: CPT | Mod: S$GLB,,, | Performed by: INTERNAL MEDICINE

## 2023-07-05 PROCEDURE — 3074F SYST BP LT 130 MM HG: CPT | Mod: CPTII,S$GLB,, | Performed by: INTERNAL MEDICINE

## 2023-07-05 NOTE — PROGRESS NOTES
Subjective:     Patient ID: Chuy Stevens is a 68 y.o. male     Chief Complaint: Disease Management     PCP: [facility]: Mayra Pete NP    HPI  68 yr old man not really sure of why he is here and who sent him. His PCP is Mayra Pete NP at the [facility] on [address].  He has generalized pain, in various muscles & joints with AM stiffness that can last as long as 2 hrs. He is improved as day progresses but not totally.  He always has muscle tension in his neck and shoulders, tires easily, has difficulty concentrating, often has anxiety attacks. He denies depression.  He fatigues easily but states that he thinks he sleeps well.   He has been having these sxs for 6 months to 1 year.  Has not had any specific rx.  He has cetrilobular emphysema with chronic SOB & cough and in the past steroids presumably used for this resulted in hallucinations.         Current Outpatient Medications   Medication Sig Dispense Refill    albuterol (PROVENTIL/VENTOLIN HFA) 90 mcg/actuation inhaler Inhale 2 puffs into the lungs every 6 (six) hours as needed for Wheezing. 18 g 1    albuterol (PROVENTIL/VENTOLIN HFA) 90 mcg/actuation inhaler Inhale 1-2 puffs into the lungs every 6 (six) hours as needed for Wheezing. 8.5 g 1    amLODIPine (NORVASC) 10 MG tablet Take 1 tablet (10 mg total) by mouth once daily. 90 tablet 3    ATROVENT HFA 17 mcg/actuation inhaler INHALE 2 PUFFS INTO THE LUNGS EVERY 6 HOURS FOR RESCUE 12.9 g 0    buPROPion (WELLBUTRIN XL) 150 MG TB24 tablet       busPIRone (BUSPAR) 10 MG tablet       diclofenac sodium (VOLTAREN) 1 % Gel APPLY 4 GRAMS TOPICALLY TO THE AFFECTED AREA FOUR TIMES DAILY      rosuvastatin (CRESTOR) 5 MG tablet       traZODone (DESYREL) 50 MG tablet       TRELEGY ELLIPTA 100-62.5-25 mcg DsDv        No current facility-administered medications for this visit.       Review of patient's allergies indicates:  No Known Allergies    Review of Systems   Constitutional:   Positive for fatigue. Negative for fever and unexpected weight change (Used to weigh 160 lbs ~ 2 yrs ago).   HENT: Negative.  Negative for mouth sores and trouble swallowing.    Eyes: Negative.  Negative for discharge and visual disturbance.   Respiratory:  Positive for cough (in past productive) and shortness of breath (worsening). Negative for wheezing (in past).    Cardiovascular:  Positive for leg swelling. Negative for chest pain and palpitations.   Gastrointestinal: Negative.  Negative for constipation and diarrhea.   Endocrine: Positive for cold intolerance and heat intolerance.   Genitourinary:  Positive for frequency (nocturia 4-5 x). Negative for difficulty urinating and genital sores.   Musculoskeletal:  Positive for arthralgias and myalgias. Negative for joint swelling, neck pain and neck stiffness. Back pain: mild.  Skin:  Positive for rash (different types; pimples).   Neurological:  Positive for tremors and weakness. Negative for seizures, syncope, numbness and headaches.   Hematological:  Does not bruise/bleed easily.   Psychiatric/Behavioral:  Positive for agitation and decreased concentration. Negative for behavioral problems, confusion, dysphoric mood and sleep disturbance. The patient is nervous/anxious.      Past Medical History:   Diagnosis Date    Hypertension        No past surgical history on file.      FHX  Father  from cancer; drank a lot.  Mom  4 brothers still living.  1 brother : ETOH  1 brother  old age; was +HIV  1 sister  from OD  Has 2 daughters A&W  Has 2 grandchildren  Has a great grandchild.    Social History     Tobacco Use    Smoking status: Never     Passive exposure: Past    Smokeless tobacco: Never    Tobacco comments:     occasionally smokes marijuana   Substance Use Topics    Alcohol use: Yes     Comment: frequent drinker    Drug use: Yes     Types: Marijuana     Comment: occasionally smokes marijuana   ETOH: in past heavy; very little last years; almost  "none in last 2-3 months.  Smoked up until age 27; now only occasional marijuana; no other recreational drugs.   Formerly a diehl/.      Objective:     /73   Pulse 68   Ht 5' 10" (1.778 m)   Wt 66.5 kg (146 lb 9.7 oz)   BMI 21.04 kg/m²     Physical Exam   Constitutional: He is oriented to person, place, and time. He appears well-developed and well-nourished. No distress.   HENT:   Head: Normocephalic and atraumatic.   Mouth/Throat: Oropharynx is clear and moist. No oropharyngeal exudate.   No facial rashes  Parotids not enlarged     Eyes: Pupils are equal, round, and reactive to light. Conjunctivae are normal. Right eye exhibits no discharge. Left eye exhibits no discharge. No scleral icterus.   Neck: No JVD present. No tracheal deviation present. No thyromegaly present.   Cardiovascular: Normal rate, regular rhythm and normal heart sounds. Exam reveals no gallop and no friction rub.   No murmur heard.  Pulmonary/Chest: Effort normal and breath sounds normal. No respiratory distress. He has no wheezes. He has no rales. He exhibits no tenderness.   Abdominal: Soft. Bowel sounds are normal. He exhibits no distension and no mass. There is no abdominal tenderness. There is no rebound and no guarding.   Musculoskeletal:         General: No tenderness or deformity.      Cervical back: Normal range of motion and neck supple.      Right lower leg: No edema (min edema).      Left lower leg: No edema (min edema).      Comments: Mild decrease in lat flexion/rotation/extension Cspine  Shoulders rob decrease in abduction R>>L w R bicipital tenderness;   Elbows: min flexion contracture on L  Wrists: bila prominent ulnar styloids but no SHT  FROM wrists/hands/fingers  Mild bilateral metacarpalgia.  Rob good ROM of hips  Knee mild rob bony hypertrophy  FROM ankles  MTPs no metatarsalgia.   Lymphadenopathy:     He has no cervical adenopathy.   Neurological: He is alert and oriented to person, place, and time. He " has normal reflexes. No cranial nerve deficit. Gait normal.   Skin: Skin is warm and dry. No rash (scratch marks winnie LE) noted. He is not diaphoretic.   Psychiatric: His behavior is normal. Mood, memory and affect normal.   Vitals reviewed.  Labs and imaging personally viewed. Bilateral hands, knees, ankles: hands w OA; knees OA & CPPD; R ankle possible non displaced fx R lateral malleolus  Assessment:   Joint pain multiple sites  Generalized OA  Chondrocalcinosis knees  Generalized weakness  Centrilobular emphysema   Doubt AAV  Fatigue  Adhesive Capsulitis      Plan:   Labs and imaging of shoulders today.  ROM exercises to shoulders  Discussed OA & CPPD.  RTC prn unless labs/newell tell us otherwise          CC: Mayra Pete NP    Addendum: 7/5/23: ESR 13 (23); CBC Hg 13.8; CMP Ca 10.6; the following were wnl or neg: ESR; CORBIN/SSA/RF/CCP; ANCA/MPO/CPK/Rito    7/5/23: Bilateral Shoulders: personally viewed: mild OA

## 2023-07-20 DIAGNOSIS — M06.9 RA (RHEUMATOID ARTHRITIS): Primary | ICD-10-CM

## 2023-07-27 ENCOUNTER — TELEPHONE (OUTPATIENT)
Dept: RHEUMATOLOGY | Facility: CLINIC | Age: 68
End: 2023-07-27
Payer: MEDICARE

## 2023-07-28 NOTE — TELEPHONE ENCOUNTER
Received [facility] dated: 11/30/22  ESR 58 (20);   RF neg   (59); our subsequent CCP was negative  CORBIN 1:80: nuclear, DFS  HIV 1 & 2 neg;